# Patient Record
Sex: MALE | Race: WHITE | NOT HISPANIC OR LATINO | Employment: FULL TIME | URBAN - NONMETROPOLITAN AREA
[De-identification: names, ages, dates, MRNs, and addresses within clinical notes are randomized per-mention and may not be internally consistent; named-entity substitution may affect disease eponyms.]

---

## 2017-02-06 ENCOUNTER — OFFICE VISIT (OUTPATIENT)
Dept: FAMILY MEDICINE CLINIC | Facility: CLINIC | Age: 52
End: 2017-02-06

## 2017-02-06 VITALS
SYSTOLIC BLOOD PRESSURE: 136 MMHG | HEART RATE: 88 BPM | TEMPERATURE: 97.9 F | BODY MASS INDEX: 31.5 KG/M2 | DIASTOLIC BLOOD PRESSURE: 84 MMHG | RESPIRATION RATE: 16 BRPM | WEIGHT: 220 LBS | HEIGHT: 70 IN | OXYGEN SATURATION: 97 %

## 2017-02-06 DIAGNOSIS — R53.83 FATIGUE, UNSPECIFIED TYPE: ICD-10-CM

## 2017-02-06 DIAGNOSIS — E66.9 OBESITY, UNSPECIFIED OBESITY SEVERITY, UNSPECIFIED OBESITY TYPE: ICD-10-CM

## 2017-02-06 DIAGNOSIS — M77.11 LATERAL EPICONDYLITIS OF RIGHT ELBOW: Primary | ICD-10-CM

## 2017-02-06 PROCEDURE — 99214 OFFICE O/P EST MOD 30 MIN: CPT | Performed by: FAMILY MEDICINE

## 2017-02-06 RX ORDER — NABUMETONE 750 MG/1
750 TABLET, FILM COATED ORAL 2 TIMES DAILY
Qty: 60 TABLET | Refills: 2 | Status: SHIPPED | OUTPATIENT
Start: 2017-02-06 | End: 2017-03-24

## 2017-02-07 ENCOUNTER — LAB (OUTPATIENT)
Dept: LAB | Facility: OTHER | Age: 52
End: 2017-02-07

## 2017-02-07 DIAGNOSIS — R53.83 FATIGUE, UNSPECIFIED TYPE: ICD-10-CM

## 2017-02-07 DIAGNOSIS — R73.9 HYPERGLYCEMIA: Primary | ICD-10-CM

## 2017-02-07 DIAGNOSIS — E66.9 OBESITY, UNSPECIFIED OBESITY SEVERITY, UNSPECIFIED OBESITY TYPE: ICD-10-CM

## 2017-02-07 LAB
ALBUMIN SERPL-MCNC: 4.6 G/DL (ref 3.2–5.5)
ALBUMIN/GLOB SERPL: 1.4 G/DL (ref 1–3)
ALP SERPL-CCNC: 53 U/L (ref 15–121)
ALT SERPL W P-5'-P-CCNC: 27 U/L (ref 10–60)
ANION GAP SERPL CALCULATED.3IONS-SCNC: 9 MMOL/L (ref 5–15)
AST SERPL-CCNC: 24 U/L (ref 10–60)
BASOPHILS # BLD AUTO: 0.02 10*3/MM3 (ref 0–0.2)
BASOPHILS NFR BLD AUTO: 0.4 % (ref 0–2)
BILIRUB SERPL-MCNC: 1.2 MG/DL (ref 0.2–1)
BUN BLD-MCNC: 14 MG/DL (ref 8–25)
BUN/CREAT SERPL: 14 (ref 7–25)
CALCIUM SPEC-SCNC: 9.3 MG/DL (ref 8.4–10.8)
CHLORIDE SERPL-SCNC: 107 MMOL/L (ref 100–112)
CHOLEST SERPL-MCNC: 221 MG/DL (ref 150–200)
CO2 SERPL-SCNC: 25 MMOL/L (ref 20–32)
CREAT BLD-MCNC: 1 MG/DL (ref 0.4–1.3)
DEPRECATED RDW RBC AUTO: 43.9 FL (ref 35.1–43.9)
EOSINOPHIL # BLD AUTO: 0.25 10*3/MM3 (ref 0–0.7)
EOSINOPHIL NFR BLD AUTO: 4.4 % (ref 0–7)
ERYTHROCYTE [DISTWIDTH] IN BLOOD BY AUTOMATED COUNT: 14.3 % (ref 11.5–14.5)
GFR SERPL CREATININE-BSD FRML MDRD: 78 ML/MIN/1.73 (ref 56–130)
GLOBULIN UR ELPH-MCNC: 3.2 GM/DL (ref 2.5–4.6)
GLUCOSE BLD-MCNC: 108 MG/DL (ref 70–100)
HCT VFR BLD AUTO: 43 % (ref 39–49)
HDLC SERPL-MCNC: 51 MG/DL (ref 35–100)
HGB BLD-MCNC: 14.2 G/DL (ref 13.7–17.3)
LDLC SERPL CALC-MCNC: 141 MG/DL
LDLC/HDLC SERPL: 2.76 {RATIO}
LYMPHOCYTES # BLD AUTO: 1.86 10*3/MM3 (ref 0.6–4.2)
LYMPHOCYTES NFR BLD AUTO: 32.6 % (ref 10–50)
MCH RBC QN AUTO: 28.3 PG (ref 26.5–34)
MCHC RBC AUTO-ENTMCNC: 33 G/DL (ref 31.5–36.3)
MCV RBC AUTO: 85.8 FL (ref 80–98)
MONOCYTES # BLD AUTO: 0.77 10*3/MM3 (ref 0–0.9)
MONOCYTES NFR BLD AUTO: 13.5 % (ref 0–12)
NEUTROPHILS # BLD AUTO: 2.81 10*3/MM3 (ref 2–8.6)
NEUTROPHILS NFR BLD AUTO: 49.1 % (ref 37–80)
PLATELET # BLD AUTO: 240 10*3/MM3 (ref 150–450)
PMV BLD AUTO: 9.1 FL (ref 8–12)
POTASSIUM BLD-SCNC: 4.4 MMOL/L (ref 3.4–5.4)
PROT SERPL-MCNC: 7.8 G/DL (ref 6.7–8.2)
RBC # BLD AUTO: 5.01 10*6/MM3 (ref 4.37–5.74)
SODIUM BLD-SCNC: 141 MMOL/L (ref 134–146)
TRIGL SERPL-MCNC: 145 MG/DL (ref 35–160)
VLDLC SERPL-MCNC: 29 MG/DL
WBC NRBC COR # BLD: 5.71 10*3/MM3 (ref 3.2–9.8)

## 2017-02-07 PROCEDURE — 83036 HEMOGLOBIN GLYCOSYLATED A1C: CPT | Performed by: FAMILY MEDICINE

## 2017-02-07 PROCEDURE — 85025 COMPLETE CBC W/AUTO DIFF WBC: CPT | Performed by: FAMILY MEDICINE

## 2017-02-07 PROCEDURE — G0103 PSA SCREENING: HCPCS | Performed by: FAMILY MEDICINE

## 2017-02-07 PROCEDURE — 36415 COLL VENOUS BLD VENIPUNCTURE: CPT | Performed by: FAMILY MEDICINE

## 2017-02-07 PROCEDURE — 80061 LIPID PANEL: CPT | Performed by: FAMILY MEDICINE

## 2017-02-07 PROCEDURE — 84443 ASSAY THYROID STIM HORMONE: CPT | Performed by: FAMILY MEDICINE

## 2017-02-07 PROCEDURE — 80053 COMPREHEN METABOLIC PANEL: CPT | Performed by: FAMILY MEDICINE

## 2017-02-08 LAB
HBA1C MFR BLD: 5.78 % (ref 4–5.6)
PSA SERPL-MCNC: 0.37 NG/ML (ref 0–4)
TSH SERPL DL<=0.05 MIU/L-ACNC: 3.08 MIU/ML (ref 0.46–4.68)

## 2017-03-01 ENCOUNTER — OFFICE VISIT (OUTPATIENT)
Dept: FAMILY MEDICINE CLINIC | Facility: CLINIC | Age: 52
End: 2017-03-01

## 2017-03-01 VITALS
OXYGEN SATURATION: 97 % | TEMPERATURE: 97.7 F | DIASTOLIC BLOOD PRESSURE: 98 MMHG | HEART RATE: 76 BPM | WEIGHT: 215 LBS | BODY MASS INDEX: 30.78 KG/M2 | SYSTOLIC BLOOD PRESSURE: 164 MMHG | HEIGHT: 70 IN | RESPIRATION RATE: 16 BRPM

## 2017-03-01 DIAGNOSIS — E78.5 HYPERLIPIDEMIA, UNSPECIFIED HYPERLIPIDEMIA TYPE: Primary | ICD-10-CM

## 2017-03-01 DIAGNOSIS — R73.9 HYPERGLYCEMIA: ICD-10-CM

## 2017-03-01 DIAGNOSIS — Z12.12 SCREENING FOR COLORECTAL CANCER: ICD-10-CM

## 2017-03-01 DIAGNOSIS — Z12.11 SCREENING FOR COLORECTAL CANCER: ICD-10-CM

## 2017-03-01 DIAGNOSIS — IMO0001 ELEVATED BLOOD PRESSURE: ICD-10-CM

## 2017-03-01 PROCEDURE — 99214 OFFICE O/P EST MOD 30 MIN: CPT | Performed by: FAMILY MEDICINE

## 2017-03-01 NOTE — PROGRESS NOTES
Subjective   Zurdo Rothman is a 51 y.o. male who presents to the office for follow-up and review of labs.     History of Present Illness    patient with history of obesity ad fatigue is in today for  Re-eval and review of labs.  No new complaints.  The following portions of the patient's history were reviewed and updated as appropriate: allergies, current medications, past family history, past medical history, past social history, past surgical history and problem list.    Review of Systems   Constitutional: Positive for fatigue.   HENT: Negative.    Eyes: Negative.    Respiratory: Negative.    Cardiovascular: Negative.    Gastrointestinal: Negative.    Endocrine: Negative.    Genitourinary: Negative.    Musculoskeletal: Positive for arthralgias. Negative for joint swelling.   Skin: Negative.    Allergic/Immunologic: Negative.    Neurological: Positive for weakness. Negative for numbness.   Hematological: Negative.    Psychiatric/Behavioral: Negative.    All other systems reviewed and are negative.      Objective   Physical Exam   Constitutional: He is oriented to person, place, and time. He appears well-developed and well-nourished.   HENT:   Head: Normocephalic and atraumatic.   Right Ear: External ear normal.   Left Ear: External ear normal.   Nose: Nose normal.   Mouth/Throat: Oropharynx is clear and moist.   Eyes: Conjunctivae and EOM are normal. Pupils are equal, round, and reactive to light.   Neck: Normal range of motion. Neck supple.   Cardiovascular: Normal rate, regular rhythm, normal heart sounds and intact distal pulses.  Exam reveals no gallop and no friction rub.    No murmur heard.  Pulmonary/Chest: Effort normal and breath sounds normal. He has no wheezes. He has no rales.   Abdominal: Soft. Bowel sounds are normal. He exhibits no mass. There is no tenderness. There is no rebound and no guarding.   Central obesity   Musculoskeletal: Normal range of motion.   Neurological: He is alert and  oriented to person, place, and time. He has normal reflexes. No cranial nerve deficit. He exhibits normal muscle tone.   Skin: Skin is warm and dry. No rash noted.   Psychiatric: He has a normal mood and affect. His behavior is normal. Judgment and thought content normal.   Nursing note and vitals reviewed.      Assessment/Plan   Zurdo was seen today for annual exam.    Diagnoses and all orders for this visit:    Hyperlipidemia, unspecified hyperlipidemia type  -     Comprehensive Metabolic Panel; Future  -     LDL Cholesterol, Direct; Future    Hyperglycemia  -     Hemoglobin A1c; Future    Elevated blood pressure    Screening for colorectal cancer  -     Ambulatory Referral to General Surgery      Repeat blood pressure was 154/78. Dietary and lifestyle modifications encouraged.  Weight loss encouraged.     Labs are reviewed with patient.    Orders Only on 02/07/2017   Component Date Value Ref Range Status   • Hemoglobin A1C 02/07/2017 5.78* 4 - 5.6 % Final   Lab on 02/07/2017   Component Date Value Ref Range Status   • Glucose 02/07/2017 108* 70 - 100 mg/dL Final   • BUN 02/07/2017 14  8 - 25 mg/dL Final   • Creatinine 02/07/2017 1.00  0.40 - 1.30 mg/dL Final   • Sodium 02/07/2017 141  134 - 146 mmol/L Final   • Potassium 02/07/2017 4.4  3.4 - 5.4 mmol/L Final   • Chloride 02/07/2017 107  100 - 112 mmol/L Final   • CO2 02/07/2017 25.0  20.0 - 32.0 mmol/L Final   • Calcium 02/07/2017 9.3  8.4 - 10.8 mg/dL Final   • Total Protein 02/07/2017 7.8  6.7 - 8.2 g/dL Final   • Albumin 02/07/2017 4.60  3.20 - 5.50 g/dL Final   • ALT (SGPT) 02/07/2017 27  10 - 60 U/L Final   • AST (SGOT) 02/07/2017 24  10 - 60 U/L Final   • Alkaline Phosphatase 02/07/2017 53  15 - 121 U/L Final   • Total Bilirubin 02/07/2017 1.2* 0.2 - 1.0 mg/dL Final   • eGFR Non African Amer 02/07/2017 78  56 - 130 mL/min/1.73 Final   • Globulin 02/07/2017 3.2  2.5 - 4.6 gm/dL Final   • A/G Ratio 02/07/2017 1.4  1.0 - 3.0 g/dL Final   • BUN/Creatinine  Ratio 02/07/2017 14.0  7.0 - 25.0 Final   • Anion Gap 02/07/2017 9.0  5.0 - 15.0 mmol/L Final   • Total Cholesterol 02/07/2017 221* 150 - 200 mg/dL Final   • Triglycerides 02/07/2017 145  35 - 160 mg/dL Final   • HDL Cholesterol 02/07/2017 51  35 - 100 mg/dL Final   • LDL Cholesterol  02/07/2017 141  mg/dL Final   • VLDL Cholesterol 02/07/2017 29  mg/dL Final   • LDL/HDL Ratio 02/07/2017 2.76   Final   • TSH 02/07/2017 3.080  0.460 - 4.680 mIU/mL Final   • PSA 02/07/2017 0.368  0.000 - 4.000 ng/mL Final   • WBC 02/07/2017 5.71  3.20 - 9.80 10*3/mm3 Final   • RBC 02/07/2017 5.01  4.37 - 5.74 10*6/mm3 Final   • Hemoglobin 02/07/2017 14.2  13.7 - 17.3 g/dL Final   • Hematocrit 02/07/2017 43.0  39.0 - 49.0 % Final   • MCV 02/07/2017 85.8  80.0 - 98.0 fL Final   • MCH 02/07/2017 28.3  26.5 - 34.0 pg Final   • MCHC 02/07/2017 33.0  31.5 - 36.3 g/dL Final   • RDW 02/07/2017 14.3  11.5 - 14.5 % Final   • RDW-SD 02/07/2017 43.9  35.1 - 43.9 fl Final   • MPV 02/07/2017 9.1  8.0 - 12.0 fL Final   • Platelets 02/07/2017 240  150 - 450 10*3/mm3 Final   • Neutrophil % 02/07/2017 49.1  37.0 - 80.0 % Final   • Lymphocyte % 02/07/2017 32.6  10.0 - 50.0 % Final   • Monocyte % 02/07/2017 13.5* 0.0 - 12.0 % Final   • Eosinophil % 02/07/2017 4.4  0.0 - 7.0 % Final   • Basophil % 02/07/2017 0.4  0.0 - 2.0 % Final   • Neutrophils, Absolute 02/07/2017 2.81  2.00 - 8.60 10*3/mm3 Final   • Lymphocytes, Absolute 02/07/2017 1.86  0.60 - 4.20 10*3/mm3 Final   • Monocytes, Absolute 02/07/2017 0.77  0.00 - 0.90 10*3/mm3 Final   • Eosinophils, Absolute 02/07/2017 0.25  0.00 - 0.70 10*3/mm3 Final   • Basophils, Absolute 02/07/2017 0.02  0.00 - 0.20 10*3/mm3 Final   Abstract on 02/03/2017   Component Date Value Ref Range Status   •  Mammogram 06/27/2013 Complete.   Ordering provider - Laron Santos   Final   ]

## 2017-03-07 DIAGNOSIS — Z12.11 SCREEN FOR COLON CANCER: Primary | ICD-10-CM

## 2017-03-07 RX ORDER — DEXTROSE AND SODIUM CHLORIDE 5; .45 G/100ML; G/100ML
100 INJECTION, SOLUTION INTRAVENOUS CONTINUOUS
Status: CANCELLED | OUTPATIENT
Start: 2017-03-29

## 2017-03-29 ENCOUNTER — ANESTHESIA EVENT (OUTPATIENT)
Dept: GASTROENTEROLOGY | Facility: HOSPITAL | Age: 52
End: 2017-03-29

## 2017-03-29 ENCOUNTER — ANESTHESIA (OUTPATIENT)
Dept: GASTROENTEROLOGY | Facility: HOSPITAL | Age: 52
End: 2017-03-29

## 2017-03-29 ENCOUNTER — HOSPITAL ENCOUNTER (OUTPATIENT)
Facility: HOSPITAL | Age: 52
Setting detail: HOSPITAL OUTPATIENT SURGERY
Discharge: HOME | End: 2017-03-29
Attending: SURGERY | Admitting: SURGERY

## 2017-03-29 VITALS
HEART RATE: 68 BPM | SYSTOLIC BLOOD PRESSURE: 115 MMHG | TEMPERATURE: 98.2 F | BODY MASS INDEX: 30.46 KG/M2 | OXYGEN SATURATION: 94 % | WEIGHT: 212.74 LBS | RESPIRATION RATE: 20 BRPM | DIASTOLIC BLOOD PRESSURE: 71 MMHG | HEIGHT: 70 IN

## 2017-03-29 DIAGNOSIS — Z12.11 SCREEN FOR COLON CANCER: ICD-10-CM

## 2017-03-29 PROCEDURE — 45378 DIAGNOSTIC COLONOSCOPY: CPT | Performed by: SURGERY

## 2017-03-29 PROCEDURE — 25010000002 PROPOFOL 10 MG/ML EMULSION: Performed by: NURSE ANESTHETIST, CERTIFIED REGISTERED

## 2017-03-29 PROCEDURE — 25010000002 MIDAZOLAM PER 1 MG: Performed by: NURSE ANESTHETIST, CERTIFIED REGISTERED

## 2017-03-29 PROCEDURE — 25010000002 FENTANYL CITRATE (PF) 100 MCG/2ML SOLUTION: Performed by: NURSE ANESTHETIST, CERTIFIED REGISTERED

## 2017-03-29 RX ORDER — PROMETHAZINE HYDROCHLORIDE 25 MG/ML
12.5 INJECTION, SOLUTION INTRAMUSCULAR; INTRAVENOUS ONCE AS NEEDED
Status: DISCONTINUED | OUTPATIENT
Start: 2017-03-29 | End: 2017-03-29 | Stop reason: HOSPADM

## 2017-03-29 RX ORDER — ONDANSETRON 2 MG/ML
4 INJECTION INTRAMUSCULAR; INTRAVENOUS ONCE AS NEEDED
Status: DISCONTINUED | OUTPATIENT
Start: 2017-03-29 | End: 2017-03-29 | Stop reason: HOSPADM

## 2017-03-29 RX ORDER — PROMETHAZINE HYDROCHLORIDE 25 MG/1
25 TABLET ORAL ONCE AS NEEDED
Status: DISCONTINUED | OUTPATIENT
Start: 2017-03-29 | End: 2017-03-29 | Stop reason: HOSPADM

## 2017-03-29 RX ORDER — MIDAZOLAM HYDROCHLORIDE 1 MG/ML
INJECTION INTRAMUSCULAR; INTRAVENOUS AS NEEDED
Status: DISCONTINUED | OUTPATIENT
Start: 2017-03-29 | End: 2017-03-29 | Stop reason: SURG

## 2017-03-29 RX ORDER — PROPOFOL 10 MG/ML
VIAL (ML) INTRAVENOUS AS NEEDED
Status: DISCONTINUED | OUTPATIENT
Start: 2017-03-29 | End: 2017-03-29 | Stop reason: SURG

## 2017-03-29 RX ORDER — FENTANYL CITRATE 50 UG/ML
INJECTION, SOLUTION INTRAMUSCULAR; INTRAVENOUS AS NEEDED
Status: DISCONTINUED | OUTPATIENT
Start: 2017-03-29 | End: 2017-03-29 | Stop reason: SURG

## 2017-03-29 RX ORDER — DEXTROSE AND SODIUM CHLORIDE 5; .45 G/100ML; G/100ML
100 INJECTION, SOLUTION INTRAVENOUS CONTINUOUS
Status: DISCONTINUED | OUTPATIENT
Start: 2017-03-29 | End: 2017-03-29 | Stop reason: HOSPADM

## 2017-03-29 RX ORDER — PROMETHAZINE HYDROCHLORIDE 25 MG/1
25 SUPPOSITORY RECTAL ONCE AS NEEDED
Status: DISCONTINUED | OUTPATIENT
Start: 2017-03-29 | End: 2017-03-29 | Stop reason: HOSPADM

## 2017-03-29 RX ORDER — DEXAMETHASONE SODIUM PHOSPHATE 4 MG/ML
8 INJECTION, SOLUTION INTRA-ARTICULAR; INTRALESIONAL; INTRAMUSCULAR; INTRAVENOUS; SOFT TISSUE ONCE AS NEEDED
Status: DISCONTINUED | OUTPATIENT
Start: 2017-03-29 | End: 2017-03-29 | Stop reason: HOSPADM

## 2017-03-29 RX ADMIN — FENTANYL CITRATE 100 MCG: 50 INJECTION, SOLUTION INTRAMUSCULAR; INTRAVENOUS at 11:31

## 2017-03-29 RX ADMIN — DEXTROSE AND SODIUM CHLORIDE 100 ML/HR: 5; 450 INJECTION, SOLUTION INTRAVENOUS at 11:03

## 2017-03-29 RX ADMIN — MIDAZOLAM 2 MG: 1 INJECTION INTRAMUSCULAR; INTRAVENOUS at 11:31

## 2017-03-29 RX ADMIN — PROPOFOL 50 MG: 10 INJECTION, EMULSION INTRAVENOUS at 11:31

## 2017-03-29 NOTE — PLAN OF CARE
Problem: Patient Care Overview (Adult)  Goal: Plan of Care Review  Outcome: Ongoing (interventions implemented as appropriate)    03/29/17 1142   Coping/Psychosocial Response Interventions   Plan Of Care Reviewed With patient   Patient Care Overview   Progress no change   Outcome Evaluation   Outcome Summary/Follow up Plan marie well

## 2017-03-29 NOTE — ANESTHESIA PREPROCEDURE EVALUATION
Anesthesia Evaluation     NPO Status: > 6 hours   Airway   Mallampati: II  no difficulty expected  Dental - normal exam     Pulmonary - negative pulmonary ROS and normal exam   Cardiovascular - negative cardio ROS and normal exam        Neuro/Psych- negative ROS  GI/Hepatic/Renal/Endo    (+) obesity,      Musculoskeletal (-) negative ROS    Abdominal    Substance History      OB/GYN negative ob/gyn ROS         Other                                    Anesthesia Plan    ASA 2     MAC     Anesthetic plan and risks discussed with patient.

## 2017-03-29 NOTE — PLAN OF CARE
Problem: GI Endoscopy (Adult)  Goal: Signs and Symptoms of Listed Potential Problems Will be Absent or Manageable (GI Endoscopy)    03/29/17 1141   GI Endoscopy   Problems Assessed (GI Endoscopy) all   Problems Present (GI Endoscopy) none

## 2017-03-29 NOTE — ANESTHESIA POSTPROCEDURE EVALUATION
Patient: Zurdo Rothman    Procedure Summary     Date Anesthesia Start Anesthesia Stop Room / Location    03/29/17 1131 1145 [facility] ENDOSCOPY 2 / [facility] ENDOSCOPY       Procedure Diagnosis Surgeon Provider    COLONOSCOPY (N/A ) Screen for colon cancer  (Screen for colon cancer [Z12.11]) MD Kelly Winkler CRNA          Anesthesia Type: MAC  Last vitals  /89 (03/29/17 1058)    Temp 97.4 °F (36.3 °C) (03/29/17 1058)    Pulse 65 (03/29/17 1058)   Resp 20 (03/29/17 1058)    SpO2 99 % (03/29/17 1058)      Post Anesthesia Care and Evaluation    Patient location during evaluation: bedside  Patient participation: complete - patient participated  Level of consciousness: awake and alert  Pain score: 0  Pain management: adequate  Airway patency: patent  Anesthetic complications: No anesthetic complications  PONV Status: none  Cardiovascular status: acceptable  Respiratory status: acceptable  Hydration status: acceptable

## 2017-03-29 NOTE — H&P
No chief complaint on file.     Referred by Dr DARCY Santos for screening  Zurdo Rothman is a 51 y.o. male referred today for evaluation for colonoscopy.  He notes no change in bowel habits, no blood in the stool.     Prior Colonoscopy:no  Prior Polyps:no  Family History of Colon Cancer:no  On anticoagulation:no    Past Surgical History:   Procedure Laterality Date   • HAND SURGERY Right     hand pinned back together    • INJECTION OF MEDICATION  11/14/2014    Depo Medrol (Methylprednisone) 80mg (3)  - ELEAZAR Santos     Past Medical History:   Diagnosis Date   • Acute sinusitis    • Atopic dermatitis    • Breast lump    • History of mammogram 06/27/2013    MAMMOGRAM UNILATERAL LT 90763 ([facility]) (1) - ELEAZAR Santos   • Influenza    • Obesity      Social History     Social History   • Marital status:      Spouse name: Sathya   • Number of children: 3   • Years of education: N/A     Occupational History   • Not on file.     Social History Main Topics   • Smoking status: Never Smoker   • Smokeless tobacco: Never Used   • Alcohol use Yes      Comment: occassional   • Drug use: No   • Sexual activity: Yes     Partners: Female     Other Topics Concern   • Not on file     Social History Narrative     Current Facility-Administered Medications   Medication Dose Route Frequency Provider Last Rate Last Dose   • dextrose 5 % and sodium chloride 0.45 % infusion  100 mL/hr Intravenous Continuous aDvid Monge  mL/hr at 03/29/17 1103 100 mL/hr at 03/29/17 1103       Review of Systems   Constitutional: Negative.    HENT: Negative for hearing loss, nosebleeds and trouble swallowing.    Respiratory: Negative for apnea, chest tightness and shortness of breath.    Cardiovascular: Negative for chest pain and palpitations.   Gastrointestinal: Negative for abdominal distention, abdominal pain, blood in stool, constipation, diarrhea, nausea and vomiting.   Genitourinary: Negative for difficulty urinating, dysuria, frequency and  urgency.   Musculoskeletal: Negative for back pain, joint swelling and neck pain.   Skin: Negative for rash.   Neurological: Negative for dizziness, seizures, weakness, light-headedness, numbness and headaches.   Hematological: Negative for adenopathy.   Psychiatric/Behavioral: Negative for agitation. The patient is not nervous/anxious.        Vitals:    03/29/17 1058   BP: 140/89   Pulse: 65   Resp: 20   Temp: 97.4 °F (36.3 °C)   SpO2: 99%       Physical Exam   Constitutional: He is oriented to person, place, and time. He appears well-developed and well-nourished. No distress.   HENT:   Head: Normocephalic and atraumatic.   Nose: Nose normal.   Eyes: Conjunctivae are normal.   Neck: Normal range of motion. No tracheal deviation present. No thyromegaly present.   Cardiovascular: Normal rate, regular rhythm and normal heart sounds.    No murmur heard.  Pulmonary/Chest: Effort normal and breath sounds normal. No respiratory distress. He has no wheezes. He has no rales. He exhibits no tenderness.   Abdominal: Soft. He exhibits no distension. There is no tenderness. There is no rebound and no guarding. No hernia.   Musculoskeletal: He exhibits no tenderness or deformity.   Neurological: He is alert and oriented to person, place, and time.   Skin: Skin is warm and dry. No rash noted.   Psychiatric: He has a normal mood and affect. His behavior is normal. Judgment and thought content normal.   Vitals reviewed.      Assessment     In need of screening colonoscopy.    Plan     Risks, benefits, rationale and prep for colonoscopy have been discussed with the patient.  The patient indicates understanding of these issues and agrees with the plan.

## 2018-01-11 ENCOUNTER — OFFICE VISIT (OUTPATIENT)
Dept: ORTHOPEDIC SURGERY | Facility: CLINIC | Age: 53
End: 2018-01-11

## 2018-01-11 VITALS — WEIGHT: 231.6 LBS | BODY MASS INDEX: 33.16 KG/M2 | HEIGHT: 70 IN

## 2018-01-11 DIAGNOSIS — S52.251A CLOSED DISPLACED COMMINUTED FRACTURE OF SHAFT OF RIGHT ULNA, INITIAL ENCOUNTER: Primary | ICD-10-CM

## 2018-01-11 DIAGNOSIS — M25.531 RIGHT WRIST PAIN: ICD-10-CM

## 2018-01-11 DIAGNOSIS — M79.641 RIGHT HAND PAIN: ICD-10-CM
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
## 2018-01-11 DIAGNOSIS — M79.601 RIGHT ARM PAIN: Primary | ICD-10-CM

## 2018-01-11 PROCEDURE — 99214 OFFICE O/P EST MOD 30 MIN: CPT | Performed by: NURSE PRACTITIONER

## 2018-01-11 PROCEDURE — 25530 CLTX ULNAR SHFT FX W/O MNPJ: CPT | Performed by: NURSE PRACTITIONER

## 2018-01-11 RX ORDER — HYDROCODONE BITARTRATE AND ACETAMINOPHEN 7.5; 325 MG/1; MG/1
1 TABLET ORAL EVERY 6 HOURS PRN
Qty: 40 TABLET | Refills: 0 | Status: SHIPPED | OUTPATIENT
Start: 2018-01-11 | End: 2018-01-21

## 2018-01-11 NOTE — PROGRESS NOTES
Zurdo Rothman is a 52 y.o. male   Primary provider:  Laron Santos MD       Chief Complaint   Patient presents with   • Right Wrist - Establish Care     Xray today at [place]       HISTORY OF PRESENT ILLNESS:    HPI Comments: Patient complains of right wrist/forearm pain after being kicked by a horse last night.  Patient had x-rays today per his primary care physician which showed an ulnar fracture.  Patient was referred to orthopedics.  Pain is described as moderate in severity.  Pain is described as aching in nature with associated bruising, swelling and popping sensations.  Pain is worse with any movement of his right wrist and palpation.  He has tried nothing for the pain thus far.     Wrist Injury    Incident onset: 1/10/2018. The incident occurred at home. Injury mechanism: was kicked by a horse. The pain is present in the right wrist and right forearm. The quality of the pain is described as aching. The pain is at a severity of 6/10. The pain is moderate. The pain has been constant since the incident. The symptoms are aggravated by movement and palpation. He has tried nothing for the symptoms.        CONCURRENT MEDICAL HISTORY:    Past Medical History:   Diagnosis Date   • Acute sinusitis    • Atopic dermatitis    • Breast lump    • History of mammogram 06/27/2013    MAMMOGRAM UNILATERAL LT [address] ([facility]) (1) - ELEAZAR Santos   • Influenza    • Obesity        No Known Allergies      Current Outpatient Prescriptions:   •  HYDROcodone-acetaminophen (NORCO) 7.5-325 MG per tablet, Take 1 tablet by mouth Every 6 (Six) Hours As Needed for Moderate Pain  for up to 10 days., Disp: 40 tablet, Rfl: 0    Past Surgical History:   Procedure Laterality Date   • COLONOSCOPY N/A 3/29/2017    Procedure: COLONOSCOPY;  Surgeon: David Monge MD;  Location: [facility] ENDOSCOPY;  Service:    • HAND SURGERY Right     hand pinned back together    • INJECTION OF MEDICATION  11/14/2014    Depo Medrol (Methylprednisone) 80mg  "(3)  - ELEAZAR Santos       Family History   Problem Relation Age of Onset   • Cancer Mother    • Heart disease Father         Social History     Social History   • Marital status:      Spouse name: Sathya   • Number of children: 3   • Years of education: N/A     Occupational History   • Not on file.     Social History Main Topics   • Smoking status: Never Smoker   • Smokeless tobacco: Never Used   • Alcohol use Yes      Comment: occassional   • Drug use: No   • Sexual activity: Yes     Partners: Female     Other Topics Concern   • Not on file     Social History Narrative        Review of Systems   Musculoskeletal: Positive for joint swelling.        Right wrist pain. Right forearm pain.    All other systems reviewed and are negative.      PHYSICAL EXAMINATION:       Ht 177.8 cm (70\")  Wt 105 kg (231 lb 9.6 oz)  BMI 33.23 kg/m2    Physical Exam   Constitutional: He is oriented to person, place, and time. Vital signs are normal. He appears well-developed and well-nourished.   HENT:   Head: Normocephalic.   Pulmonary/Chest: Effort normal. No respiratory distress.   Abdominal: Soft. He exhibits no distension.   Neurological: He is alert and oriented to person, place, and time. GCS eye subscore is 4. GCS verbal subscore is 5. GCS motor subscore is 6.   Skin: Skin is warm, dry and intact.   Psychiatric: He has a normal mood and affect. His speech is normal and behavior is normal. Judgment and thought content normal. Cognition and memory are normal.   Vitals reviewed.      GAIT:     [x]  Normal  []  Antalgic    Assistive device: [x]  None  []  Walker     []  Crutches  []  Cane     []  Wheelchair  []  Stretcher    Right Hand Exam     Tenderness   Right hand tenderness location: diffuse.    Other   Erythema: absent  Sensation: normal  Pulse: present    Comments:  Moderate swelling present to wrist and forearm.  No obvious deformity noted.  Skin is intact. Range of motion and strength assessments deferred due to acute " ulnar shaft fracture.  Distal pulses strong.  Capillary refill is less than 3 seconds.      Left Hand Exam   Left hand exam is normal.            Xr Forearm 2 View Right    Result Date: 1/11/2018  Narrative: EXAM DESCRIPTION: RIGHT WRIST THREE VIEWS CLINICAL HISTORY: Status post kicked by horse with pain. COMPARISON: None FINDINGS:  PA, oblique, and lateral projections of the right wrist are obtained. There is short oblique oriented comminuted fracture with displacement involving the distal diaphysis of the ulna. The displacement is seen in a lateral and volar direction by approximately 4-5 mm. No additional fracture or dislocation identified. The mineralization is appropriate. The radiocarpal alignment is normal and the carpal arcs are maintained.     Impression: Comminuted short oblique oriented fracture with displacement involving the distal diaphysis of the ulna. EXAM DESCRIPTION: RIGHT FOREARM TWO VIEWS CLINICAL HISTORY: Status post kicked by horse with pain. COMPARISON: Right wrist same date. FINDINGS:  Frontal and lateral projections of the right forearm redemonstrates the comminuted displaced short oblique fracture involving the distal diaphysis of the ulna. Otherwise, alignment and mineralization are normal with no additional fracture or dislocation identified. The elbow joint is intact. No effusion. There is a tiny olecranon spur likely associated with the triceps insertion. IMPRESSION: Comminuted oblique oriented fracture with displacement involving the distal diaphysis of the ulna. Comment: Please note that multiple exams (two) are included in this report. Electronically signed by:  Pascual Cooper MD  1/11/2018 9:31 AM CST Workstation: [id number]    Xr Wrist 3+ View Right    Result Date: 1/11/2018  Narrative: EXAM DESCRIPTION: RIGHT WRIST THREE VIEWS CLINICAL HISTORY: Status post kicked by horse with pain. COMPARISON: None FINDINGS:  PA, oblique, and lateral projections of the right wrist are obtained.  There is short oblique oriented comminuted fracture with displacement involving the distal diaphysis of the ulna. The displacement is seen in a lateral and volar direction by approximately 4-5 mm. No additional fracture or dislocation identified. The mineralization is appropriate. The radiocarpal alignment is normal and the carpal arcs are maintained.     Impression: Comminuted short oblique oriented fracture with displacement involving the distal diaphysis of the ulna. EXAM DESCRIPTION: RIGHT FOREARM TWO VIEWS CLINICAL HISTORY: Status post kicked by horse with pain. COMPARISON: Right wrist same date. FINDINGS:  Frontal and lateral projections of the right forearm redemonstrates the comminuted displaced short oblique fracture involving the distal diaphysis of the ulna. Otherwise, alignment and mineralization are normal with no additional fracture or dislocation identified. The elbow joint is intact. No effusion. There is a tiny olecranon spur likely associated with the triceps insertion. IMPRESSION: Comminuted oblique oriented fracture with displacement involving the distal diaphysis of the ulna. Comment: Please note that multiple exams (two) are included in this report. Electronically signed by:  Pascual Cooper MD  1/11/2018 9:31 AM CST Workstation: [id number]    Xr Hand 3+ View Right    Result Date: 1/11/2018  Narrative: Procedure: XR HAND 3 OR MORE VIEWS. History: pain, M79.641 Pain in right hand. Comparison: None Findings: Three views of the right hand were obtained. Comminuted, oblique fracture of the distal ulnar diaphysis. No other fractures are seen.     Impression: Impression: Comminuted, oblique fracture of the distal ulnar diaphysis. Electronically signed by:  Laron Hernandez MD  1/11/2018 9:24 AM CST Workstation: [id number]    ASSESSMENT:    Diagnoses and all orders for this visit:    Closed displaced comminuted fracture of shaft of right ulna, initial encounter    Struck by horse, initial encounter    Other  orders  -     HYDROcodone-acetaminophen (NORCO) 7.5-325 MG per tablet; Take 1 tablet by mouth Every 6 (Six) Hours As Needed for Moderate Pain  for up to 10 days.      PLAN    X-rays of right wrist, right hand and right forearm view today.  Discussed case with Dr. Fernandes who has also reviewed x-rays in consultation.  Recommend short arm Exosplint today for immobilization.  Recommend elevation of the right wrist above the heart frequently to minimize swelling/pain.  Recommend ice therapy intermittently 3-4 times daily for 20 minutes at a time.  Recommend Norco as needed for pain.  Recommend Ibuprofen in conjunction with Norco for pain as well.  Follow-up with Dr. Fernandes on Monday to discuss continued conservative treatment of ulnar fracture versus surgical treatment.    Return Monday with Dr. Fernandes.      This document has been electronically signed by BROOKLYN Spangler on January 11, 2018 2:35 PM      BROOKLYN Spangler

## 2018-01-15 ENCOUNTER — OFFICE VISIT (OUTPATIENT)
Dept: ORTHOPEDIC SURGERY | Facility: CLINIC | Age: 53
End: 2018-01-15

## 2018-01-15 VITALS — WEIGHT: 231 LBS | BODY MASS INDEX: 33.07 KG/M2 | HEIGHT: 70 IN
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
## 2018-01-15 DIAGNOSIS — S52.251D CLOSED DISPLACED COMMINUTED FRACTURE OF SHAFT OF RIGHT ULNA WITH ROUTINE HEALING, SUBSEQUENT ENCOUNTER: Primary | ICD-10-CM

## 2018-01-15 PROCEDURE — 99024 POSTOP FOLLOW-UP VISIT: CPT | Performed by: ORTHOPAEDIC SURGERY

## 2018-01-15 NOTE — PROGRESS NOTES
Zurdo Rothman is a 52 y.o. male returns for     Chief Complaint   Patient presents with   • Right Wrist - Follow-up     Xray 1/11/18       HISTORY OF PRESENT ILLNESS:The patient is here for evaluation as to need for surgery patient is minimally displaced shortened fractures direct blow injury to his right ulna about 1 inch distal to the radial carpal joint area.  Patient was treated by Akila placed in a splint and is doing much better than he was initial visit at length the risk and benefits of surgical versus nonsurgical treatment plan at this point in time since satisfactory alignment to wear the splint although it can be removed for bathing and then recheck him back in a month re-x-rayed of his major deformity within surgery if not then now this to heal even though the be some shortening offset and certainly not going to be a functional problem that he get its precertified for surgery but in case it slips then we will anticipate re-positioning and further and surgery right conservative nonoperative course first patient be returned in 2 weeks and re-x-ray at that time       CONCURRENT MEDICAL HISTORY:    Past Medical History:   Diagnosis Date   • Acute sinusitis    • Atopic dermatitis    • Breast lump    • History of mammogram 06/27/2013    MAMMOGRAM UNILATERAL LT 96236 ([facility]) (1) - ELEAZAR Santos   • Influenza    • Obesity        No Known Allergies      Current Outpatient Prescriptions:   •  HYDROcodone-acetaminophen (NORCO) 7.5-325 MG per tablet, Take 1 tablet by mouth Every 6 (Six) Hours As Needed for Moderate Pain  for up to 10 days., Disp: 40 tablet, Rfl: 0    Past Surgical History:   Procedure Laterality Date   • COLONOSCOPY N/A 3/29/2017    Procedure: COLONOSCOPY;  Surgeon: David Monge MD;  Location: [facility] ENDOSCOPY;  Service:    • HAND SURGERY Right     hand pinned back together    • INJECTION OF MEDICATION  11/14/2014    Depo Medrol (Methylprednisone) 80mg (3)  - ELEAZAR Santos       ROS  No fevers  "or chills.  No chest pain or shortness of air.  No GI or  disturbances.    PHYSICAL EXAMINATION:       Ht 177.8 cm (70\")  Wt 105 kg (231 lb)  BMI 33.15 kg/m2    Physical Exam    GAIT:     []  Normal  []  Antalgic    Assistive device: []  None  []  Walker     []  Crutches  []  Cane     []  Wheelchair  []  Stretcher    Ortho Exam      Xr Forearm 2 View Right    Result Date: 1/11/2018  Narrative: EXAM DESCRIPTION: RIGHT WRIST THREE VIEWS CLINICAL HISTORY: Status post kicked by horse with pain. COMPARISON: None FINDINGS:  PA, oblique, and lateral projections of the right wrist are obtained. There is short oblique oriented comminuted fracture with displacement involving the distal diaphysis of the ulna. The displacement is seen in a lateral and volar direction by approximately 4-5 mm. No additional fracture or dislocation identified. The mineralization is appropriate. The radiocarpal alignment is normal and the carpal arcs are maintained.     Impression: Comminuted short oblique oriented fracture with displacement involving the distal diaphysis of the ulna. EXAM DESCRIPTION: RIGHT FOREARM TWO VIEWS CLINICAL HISTORY: Status post kicked by horse with pain. COMPARISON: Right wrist same date. FINDINGS:  Frontal and lateral projections of the right forearm redemonstrates the comminuted displaced short oblique fracture involving the distal diaphysis of the ulna. Otherwise, alignment and mineralization are normal with no additional fracture or dislocation identified. The elbow joint is intact. No effusion. There is a tiny olecranon spur likely associated with the triceps insertion. IMPRESSION: Comminuted oblique oriented fracture with displacement involving the distal diaphysis of the ulna. Comment: Please note that multiple exams (two) are included in this report. Electronically signed by:  Pascual Cooper MD  1/11/2018 9:31 AM CST Workstation: [id number]    Xr Wrist 3+ View Right    Result Date: 1/11/2018  Narrative: EXAM " DESCRIPTION: RIGHT WRIST THREE VIEWS CLINICAL HISTORY: Status post kicked by horse with pain. COMPARISON: None FINDINGS:  PA, oblique, and lateral projections of the right wrist are obtained. There is short oblique oriented comminuted fracture with displacement involving the distal diaphysis of the ulna. The displacement is seen in a lateral and volar direction by approximately 4-5 mm. No additional fracture or dislocation identified. The mineralization is appropriate. The radiocarpal alignment is normal and the carpal arcs are maintained.     Impression: Comminuted short oblique oriented fracture with displacement involving the distal diaphysis of the ulna. EXAM DESCRIPTION: RIGHT FOREARM TWO VIEWS CLINICAL HISTORY: Status post kicked by horse with pain. COMPARISON: Right wrist same date. FINDINGS:  Frontal and lateral projections of the right forearm redemonstrates the comminuted displaced short oblique fracture involving the distal diaphysis of the ulna. Otherwise, alignment and mineralization are normal with no additional fracture or dislocation identified. The elbow joint is intact. No effusion. There is a tiny olecranon spur likely associated with the triceps insertion. IMPRESSION: Comminuted oblique oriented fracture with displacement involving the distal diaphysis of the ulna. Comment: Please note that multiple exams (two) are included in this report. Electronically signed by:  Pascual Cooper MD  1/11/2018 9:31 AM CST Workstation: [id number]    Xr Hand 3+ View Right    Result Date: 1/11/2018  Narrative: Procedure: XR HAND 3 OR MORE VIEWS. History: pain, M79.641 Pain in right hand. Comparison: None Findings: Three views of the right hand were obtained. Comminuted, oblique fracture of the distal ulnar diaphysis. No other fractures are seen.     Impression: Impression: Comminuted, oblique fracture of the distal ulnar diaphysis. Electronically signed by:  Laron Hernandez MD  1/11/2018 9:24 AM CST Workstation:  [id number]            ASSESSMENT:    Diagnoses and all orders for this visit:    Closed displaced comminuted fracture of shaft of right ulna with routine healing, subsequent encounter    Struck by horse, subsequent encounter          PLAN  Observation  No Follow-up on file.    Jose G Fernandes MD

## 2018-01-29 RX ORDER — HYDROCODONE BITARTRATE AND ACETAMINOPHEN 7.5; 325 MG/1; MG/1
1 TABLET ORAL EVERY 6 HOURS PRN
Qty: 40 TABLET | Refills: 0 | OUTPATIENT
Start: 2018-01-29

## 2018-01-29 NOTE — TELEPHONE ENCOUNTER
The patient should not be in that severe pain requiring narcotics at this time he has a follow-up appointment and we'll re-x-ray his wrist and make determination he should be on not ibuprofen or Tylenol

## 2018-01-30 DIAGNOSIS — S52.251D CLOSED DISPLACED COMMINUTED FRACTURE OF SHAFT OF RIGHT ULNA WITH ROUTINE HEALING, SUBSEQUENT ENCOUNTER: Primary | ICD-10-CM

## 2018-01-31 ENCOUNTER — OFFICE VISIT (OUTPATIENT)
Dept: ORTHOPEDIC SURGERY | Facility: CLINIC | Age: 53
End: 2018-01-31

## 2018-01-31 VITALS — BODY MASS INDEX: 33.21 KG/M2 | WEIGHT: 232 LBS | HEIGHT: 70 IN

## 2018-01-31 DIAGNOSIS — S52.251D CLOSED DISPLACED COMMINUTED FRACTURE OF SHAFT OF RIGHT ULNA WITH ROUTINE HEALING, SUBSEQUENT ENCOUNTER: Primary | ICD-10-CM

## 2018-01-31 PROCEDURE — 99024 POSTOP FOLLOW-UP VISIT: CPT | Performed by: ORTHOPAEDIC SURGERY

## 2018-01-31 NOTE — PROGRESS NOTES
"The patient is a 52 y.o. male who presents for followup.    Chief Complaint   Patient presents with   • Right Wrist - Follow-up, Fracture       HPI: f/u right ulna fx, patient wearing splint, xrays done today. States the last couple days is really gotten a lot better he is a very rigid splint on and is marked edema of the arm and the area of the wrist from dependency I recommended he get more flexible splint T have to give her a prescription for which we did and he's going to [place] next week total normally he doesn't really is reluctant to have surgery a total knee x-rays haven't changed any and they have not and he's feeling better so just start working on pronation supination and use of the splint recheck back in a month I recommend he see Dr. Garcia    No current outpatient prescriptions on file.    No Known Allergies    ROS:  No fevers or chills.  No nausea or vomiting    PHYSICAL EXAM:    Vitals:    01/31/18 0915   Weight: 105 kg (232 lb)   Height: 177.8 cm (70\")       GAIT:     [x]  Normal  []  Antalgic    Assistive device: [x]  None  []  Walker     []  Crutches  []  Cane     []  Wheelchair  []  Stretcher    Patient is awake and alert, answers questions appropriately, and is in no apparent distress.    Is considerable edema in the hand and fingers but he has a full range of motion without neurovascular deficit recommend elevation more flexible splint they have plenty has was very large and restricts his elbow flexion and rotation        ASSESSMENT:  Diagnoses and all orders for this visit:    Closed displaced comminuted fracture of shaft of right ulna with routine healing, subsequent encounter  -     Miscellaneous DME        PLAN: Mobilize and recheck back in 1 month x-ray    Return in about 1 month (around 2/28/2018).    Jose G Fernandes MD  "

## 2018-02-27 DIAGNOSIS — S52.251D CLOSED DISPLACED COMMINUTED FRACTURE OF SHAFT OF RIGHT ULNA WITH ROUTINE HEALING, SUBSEQUENT ENCOUNTER: Primary | ICD-10-CM

## 2018-02-28 ENCOUNTER — OFFICE VISIT (OUTPATIENT)
Dept: ORTHOPEDIC SURGERY | Facility: CLINIC | Age: 53
End: 2018-02-28

## 2018-02-28 VITALS — HEIGHT: 70 IN | WEIGHT: 231 LBS | BODY MASS INDEX: 33.07 KG/M2

## 2018-02-28 DIAGNOSIS — S52.251D CLOSED DISPLACED COMMINUTED FRACTURE OF SHAFT OF RIGHT ULNA WITH ROUTINE HEALING, SUBSEQUENT ENCOUNTER: Primary | ICD-10-CM

## 2018-02-28 PROCEDURE — 99024 POSTOP FOLLOW-UP VISIT: CPT | Performed by: ORTHOPAEDIC SURGERY

## 2018-02-28 NOTE — PROGRESS NOTES
"The patient is a 52 y.o. male who presents for followup.    Chief Complaint   Patient presents with   • Right Wrist - Follow-up       HPI: Patient is here today for follow up of right ulna fracture. Patient was sent to xray upon arrival. Patient states that he has pain every now and again and some swelling. Lower 6 weeks out really no complaints of pain    No current outpatient prescriptions on file.    No Known Allergies    ROS:  No fevers or chills.  No nausea or vomiting.  No chest pain shortness of breath GI or  problems.  PHYSICAL EXAM:    Vitals:    02/28/18 1616   Weight: 105 kg (231 lb)   Height: 177.8 cm (70\")   PainSc: 0-No pain       GAIT:     []  Normal  []  Antalgic    Assistive device: []  None  []  Walker     []  Crutches  []  Cane     []  Wheelchair  []  Stretcher    Patient is awake and alert, answers questions appropriately, and is in no apparent distress.    Doing well minimal pain and has near full supination but not quite good pronation is noted palpable callus neurologically intact distally.    Xr Wrist 3+ View Right    Result Date: 2/5/2018  Narrative: AP lateral oblique of the right wrist shows a fractured ulna maintained in position slight angled toward deformity and shortening but overall alignment is unchanged      ASSESSMENT:  Diagnoses and all orders for this visit:    Closed displaced comminuted fracture of shaft of right ulna with routine healing, subsequent encounter      He'll continue to work on his range of motion especially supination.  I will see him back in 4 weeks' x-ray arrival if still symptomatic.  I'll go and let him return to work on Monday.  PLAN:No Follow-up on file.    Cedrick Garcia MD  "

## 2018-03-13 ENCOUNTER — TELEPHONE (OUTPATIENT)
Dept: ORTHOPEDIC SURGERY | Facility: CLINIC | Age: 53
End: 2018-03-13

## 2018-03-13 NOTE — TELEPHONE ENCOUNTER
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  THEY ARE NOT ACCEPING THE COMPUTER PRINT OUT.  PATIENT'S WIFE, ELLEN, WILL BE PICKING IT UP.

## 2022-06-21 ENCOUNTER — OFFICE VISIT (OUTPATIENT)
Dept: FAMILY MEDICINE CLINIC | Facility: CLINIC | Age: 57
End: 2022-06-21

## 2022-06-21 VITALS
HEART RATE: 85 BPM | SYSTOLIC BLOOD PRESSURE: 138 MMHG | HEIGHT: 70 IN | TEMPERATURE: 98.2 F | DIASTOLIC BLOOD PRESSURE: 82 MMHG | WEIGHT: 227.2 LBS | BODY MASS INDEX: 32.53 KG/M2 | OXYGEN SATURATION: 98 %

## 2022-06-21 DIAGNOSIS — G47.30 SLEEP APNEA, UNSPECIFIED TYPE: ICD-10-CM

## 2022-06-21 DIAGNOSIS — M62.08 DIASTASIS OF RECTUS ABDOMINIS: ICD-10-CM

## 2022-06-21 DIAGNOSIS — Z12.5 ENCOUNTER FOR PROSTATE CANCER SCREENING: ICD-10-CM

## 2022-06-21 DIAGNOSIS — R73.9 HYPERGLYCEMIA: Primary | ICD-10-CM

## 2022-06-21 DIAGNOSIS — E78.2 MIXED HYPERLIPIDEMIA: ICD-10-CM

## 2022-06-21 PROBLEM — L57.0 ACTINIC KERATOSIS: Status: ACTIVE | Noted: 2019-04-08

## 2022-06-21 PROBLEM — R73.02 IGT (IMPAIRED GLUCOSE TOLERANCE): Status: ACTIVE | Noted: 2019-04-05

## 2022-06-21 PROCEDURE — 99204 OFFICE O/P NEW MOD 45 MIN: CPT | Performed by: FAMILY MEDICINE

## 2022-06-21 NOTE — PROGRESS NOTES
Subjective   Zurdo Rothman is a 56 y.o. male.   Patient is here today accompanied by his wife to establish care.  He has been told in the past that his blood sugar was high and cholesterol is high.  Is been at least 5 years since he has had any lab work done.  He also was concerned about a midline hernia in the abdomen.  He is not having pain but says that he has a bulge from his belly when he raises up and down.    He does have sleep apnea and is using his CPAP but it has been over 5 years since he has had any adjustments made to it.    History of Present Illness    The following portions of the patient's history were reviewed and updated as appropriate: allergies, current medications, past family history, past medical history, past social history, past surgical history and problem list.    Review of Systems   Constitutional: Negative for activity change, appetite change, diaphoresis, fatigue, fever and unexpected weight change.   HENT: Negative for congestion, ear pain, hearing loss, sinus pressure, sore throat, tinnitus, trouble swallowing and voice change.    Eyes: Negative.    Respiratory: Negative.    Cardiovascular: Negative.    Gastrointestinal: Negative for abdominal distention, abdominal pain, blood in stool, constipation, diarrhea, nausea and vomiting.   Endocrine: Negative.    Genitourinary: Negative for decreased urine volume, dysuria, frequency, hematuria and urgency.   Musculoskeletal: Negative.    Skin: Negative.    Allergic/Immunologic: Negative.    Neurological: Negative for dizziness, tremors, seizures, syncope, speech difficulty, weakness, numbness and headaches.   Hematological: Negative.    Psychiatric/Behavioral: Negative for dysphoric mood and sleep disturbance. The patient is not nervous/anxious.    All other systems reviewed and are negative.      Objective    Body mass index is 32.6 kg/m².  Vitals:    06/21/22 1448   BP: 138/82   Pulse: 85   Temp: 98.2 °F (36.8 °C)   TempSrc:  "Tympanic   SpO2: 98%   Weight: 103 kg (227 lb 3.2 oz)   Height: 177.8 cm (70\")       Physical Exam  Vitals and nursing note reviewed.   Constitutional:       General: He is not in acute distress.     Appearance: He is well-developed.   HENT:      Head: Normocephalic and atraumatic.      Nose: Nose normal.   Eyes:      Conjunctiva/sclera: Conjunctivae normal.      Pupils: Pupils are equal, round, and reactive to light.   Neck:      Thyroid: No thyromegaly.      Vascular: No JVD.      Trachea: No tracheal deviation.   Cardiovascular:      Rate and Rhythm: Normal rate and regular rhythm.      Heart sounds: Normal heart sounds. No murmur heard.  Pulmonary:      Effort: Pulmonary effort is normal.      Breath sounds: Normal breath sounds. No wheezing.   Chest:      Chest wall: No tenderness.   Abdominal:      General: Bowel sounds are normal. There is no distension.      Palpations: Abdomen is soft. There is no mass.      Tenderness: There is no abdominal tenderness.      Comments: Rectus diastases is noted   Musculoskeletal:         General: No tenderness. Normal range of motion.      Cervical back: Normal range of motion and neck supple.   Lymphadenopathy:      Cervical: No cervical adenopathy.   Skin:     General: Skin is warm and dry.      Coloration: Skin is not pale.      Findings: No erythema or rash.   Neurological:      Mental Status: He is alert and oriented to person, place, and time.      Motor: No abnormal muscle tone.      Coordination: Coordination normal.         Assessment & Plan   Diagnoses and all orders for this visit:    1. Hyperglycemia (Primary)  -     Hemoglobin A1c    2. Mixed hyperlipidemia  -     T4, Free; Future  -     TSH; Future  -     CBC & Differential; Future  -     Comprehensive Metabolic Panel; Future  -     LDL Cholesterol, Direct; Future    3. Encounter for prostate cancer screening  -     PSA SCREENING; Future    4. Sleep apnea, unspecified type  -     Ambulatory Referral to Sleep " Medicine    5. Diastasis of rectus abdominis    Will get labs including A1c and lipids, as above.  Needs PSA as well.  We will follow up accordingly.    Will refer to sleep medicine for new sleep study and follow-up on sleep apnea.    Give a handout on the diastases rectus and discussed management.  At this point there is no surgical indication but watch for any changes.          This document has been electronically signed by Maria G Ramires MD on June 21, 2022 15:30 CDT

## 2022-06-23 ENCOUNTER — LAB (OUTPATIENT)
Dept: LAB | Facility: OTHER | Age: 57
End: 2022-06-23

## 2022-06-23 DIAGNOSIS — Z12.5 ENCOUNTER FOR PROSTATE CANCER SCREENING: ICD-10-CM

## 2022-06-23 DIAGNOSIS — E78.2 MIXED HYPERLIPIDEMIA: ICD-10-CM

## 2022-06-23 LAB
ALBUMIN SERPL-MCNC: 4.2 G/DL (ref 3.5–5)
ALBUMIN/GLOB SERPL: 1.3 G/DL (ref 1.1–1.8)
ALP SERPL-CCNC: 63 U/L (ref 38–126)
ALT SERPL W P-5'-P-CCNC: 29 U/L
ANION GAP SERPL CALCULATED.3IONS-SCNC: 8 MMOL/L (ref 5–15)
ARTICHOKE IGE QN: 134 MG/DL (ref 0–100)
AST SERPL-CCNC: 23 U/L (ref 17–59)
BASOPHILS # BLD AUTO: 0.04 10*3/MM3 (ref 0–0.2)
BASOPHILS NFR BLD AUTO: 0.6 % (ref 0–1.5)
BILIRUB SERPL-MCNC: 0.5 MG/DL (ref 0.2–1.3)
BUN SERPL-MCNC: 13 MG/DL (ref 7–23)
BUN/CREAT SERPL: 13.5 (ref 7–25)
CALCIUM SPEC-SCNC: 9.2 MG/DL (ref 8.4–10.2)
CHLORIDE SERPL-SCNC: 110 MMOL/L (ref 101–112)
CO2 SERPL-SCNC: 23 MMOL/L (ref 22–30)
CREAT SERPL-MCNC: 0.96 MG/DL (ref 0.7–1.3)
DEPRECATED RDW RBC AUTO: 41.9 FL (ref 37–54)
EGFRCR SERPLBLD CKD-EPI 2021: 92.2 ML/MIN/1.73
EOSINOPHIL # BLD AUTO: 0.29 10*3/MM3 (ref 0–0.4)
EOSINOPHIL NFR BLD AUTO: 4.4 % (ref 0.3–6.2)
ERYTHROCYTE [DISTWIDTH] IN BLOOD BY AUTOMATED COUNT: 13.5 % (ref 12.3–15.4)
GLOBULIN UR ELPH-MCNC: 3.2 GM/DL (ref 2.3–3.5)
GLUCOSE SERPL-MCNC: 117 MG/DL (ref 70–99)
HBA1C MFR BLD: 5.5 % (ref 4.8–5.6)
HCT VFR BLD AUTO: 43.3 % (ref 37.5–51)
HGB BLD-MCNC: 14.1 G/DL (ref 13–17.7)
LYMPHOCYTES # BLD AUTO: 2.34 10*3/MM3 (ref 0.7–3.1)
LYMPHOCYTES NFR BLD AUTO: 35.1 % (ref 19.6–45.3)
MCH RBC QN AUTO: 28.3 PG (ref 26.6–33)
MCHC RBC AUTO-ENTMCNC: 32.6 G/DL (ref 31.5–35.7)
MCV RBC AUTO: 86.9 FL (ref 79–97)
MONOCYTES # BLD AUTO: 0.81 10*3/MM3 (ref 0.1–0.9)
MONOCYTES NFR BLD AUTO: 12.2 % (ref 5–12)
NEUTROPHILS NFR BLD AUTO: 3.18 10*3/MM3 (ref 1.7–7)
NEUTROPHILS NFR BLD AUTO: 47.7 % (ref 42.7–76)
PLATELET # BLD AUTO: 236 10*3/MM3 (ref 140–450)
PMV BLD AUTO: 9 FL (ref 6–12)
POTASSIUM SERPL-SCNC: 4.4 MMOL/L (ref 3.4–5)
PROT SERPL-MCNC: 7.4 G/DL (ref 6.3–8.6)
PSA SERPL-MCNC: 0.37 NG/ML (ref 0–4)
RBC # BLD AUTO: 4.98 10*6/MM3 (ref 4.14–5.8)
SODIUM SERPL-SCNC: 141 MMOL/L (ref 137–145)
T4 FREE SERPL-MCNC: 0.87 NG/DL (ref 0.93–1.7)
TSH SERPL DL<=0.05 MIU/L-ACNC: 2.9 UIU/ML (ref 0.27–4.2)
WBC NRBC COR # BLD: 6.66 10*3/MM3 (ref 3.4–10.8)

## 2022-06-23 PROCEDURE — 83721 ASSAY OF BLOOD LIPOPROTEIN: CPT | Performed by: FAMILY MEDICINE

## 2022-06-23 PROCEDURE — 36415 COLL VENOUS BLD VENIPUNCTURE: CPT | Performed by: FAMILY MEDICINE

## 2022-06-23 PROCEDURE — 80053 COMPREHEN METABOLIC PANEL: CPT | Performed by: FAMILY MEDICINE

## 2022-06-23 PROCEDURE — G0103 PSA SCREENING: HCPCS | Performed by: FAMILY MEDICINE

## 2022-06-23 PROCEDURE — 84439 ASSAY OF FREE THYROXINE: CPT | Performed by: FAMILY MEDICINE

## 2022-06-23 PROCEDURE — 83036 HEMOGLOBIN GLYCOSYLATED A1C: CPT | Performed by: FAMILY MEDICINE

## 2022-06-23 PROCEDURE — 84443 ASSAY THYROID STIM HORMONE: CPT | Performed by: FAMILY MEDICINE

## 2022-06-23 PROCEDURE — 85025 COMPLETE CBC W/AUTO DIFF WBC: CPT | Performed by: FAMILY MEDICINE

## 2022-06-27 RX ORDER — ROSUVASTATIN CALCIUM 5 MG/1
5 TABLET, COATED ORAL DAILY
Qty: 30 TABLET | Refills: 5 | Status: SHIPPED | OUTPATIENT
Start: 2022-06-27 | End: 2023-01-08

## 2022-06-27 NOTE — PROGRESS NOTES
Please call the patient regarding his abnormal result.  Blood sugar is very good, nondiabetic.  His cholesterol is high and I would recommend to start on medication for this.  Tell him I will send in Crestor and would like to recheck labs in 6 months, CMP and LDL

## 2022-06-28 DIAGNOSIS — E78.2 MIXED HYPERLIPIDEMIA: Primary | ICD-10-CM

## 2022-07-28 ENCOUNTER — OFFICE VISIT (OUTPATIENT)
Dept: SLEEP MEDICINE | Facility: HOSPITAL | Age: 57
End: 2022-07-28

## 2022-07-28 VITALS
DIASTOLIC BLOOD PRESSURE: 95 MMHG | SYSTOLIC BLOOD PRESSURE: 160 MMHG | BODY MASS INDEX: 32.64 KG/M2 | WEIGHT: 228 LBS | HEART RATE: 76 BPM | OXYGEN SATURATION: 97 % | HEIGHT: 70 IN

## 2022-07-28 DIAGNOSIS — G47.33 OBSTRUCTIVE SLEEP APNEA, ADULT: Primary | ICD-10-CM

## 2022-07-28 PROCEDURE — 99204 OFFICE O/P NEW MOD 45 MIN: CPT | Performed by: NURSE PRACTITIONER

## 2022-07-28 NOTE — PROGRESS NOTES
New Patient Sleep Medicine Consultation    Encounter Date: 2022         Patient's Primary Care Provider: Maria G Ramires MD  Referring Provider: Maria G Ramires MD  Reason for consultation/chief complaint: known RADHA on CPAP, re-establishing care, needing new machine    Zurdo Rothman is a 56 y.o. male who admits to no RADHA symptoms while on CPAP.    He denies cataplexy, sleep paralysis, or hypnagogic hallucinations. His bedtime is ~ 2200. He  falls asleep after 10-15 minutes, and is up 1-2 times per night. He wakes up ~ 0500. He endorses 7 hours of sleep. He drinks 4 cups of coffee, 3 teas, and 0 sodas per day. He drinks 3 alcoholic beverages per week. He is not a current smoker. He does not take sedatives or hypnotics. He has no sleepiness with driving. He occasionally naps around lunch time.     Patient is in need of a new machine. He has had his current machine for >10 years.    Wayne - 5  Wayne Sleepiness Scale  Sitting and reading: Would never doze  Watching TV: Would never doze  Sitting, inactive in a public place (e.g. a theatre or a meeting): Would never doze  As a passenger in a car for an hour without a break: Moderate chance of dozing  Lying down to rest in the afternoon when circumstances permit: Would never doze  Sitting and talking to someone: Would never doze  Sitting quietly after a lunch without alcohol: High chance of dozing  In a car, while stopped for a few minutes in traffic: Would never doze  Total score: 5    Prior Sleep Testin. Split night PSG on 2012, AHI of 78.5   2. CPAP titration on same day, recommended 8 cm H2O   3. Currently on 8 cm H2O    PAP Data:    No data available  Mask type: Nasal mask (khan)  DME: [facility], switching to [facility] due to machine availability      Past Medical History:   Diagnosis Date   • Acute sinusitis    • Atopic dermatitis    • Breast lump    • History of mammogram 2013    MAMMOGRAM UNILATERAL LT 65501 ([facility]) (1) - W.  "Vincent   • Influenza    • Obesity      Social History     Socioeconomic History   • Marital status:      Spouse name: Sathya   • Number of children: 3   Tobacco Use   • Smoking status: Never Smoker   • Smokeless tobacco: Never Used   Substance and Sexual Activity   • Alcohol use: Yes     Comment: occassional   • Drug use: No   • Sexual activity: Yes     Partners: Female     Family History   Problem Relation Age of Onset   • Cancer Mother    • Heart disease Father      Prior T&A, UPPP, maxillofacial, or bariatric surgery: None  Family history of sleep disorders: None  Other family history + for: As above  Occupation: Construction and maintenance  Marital status:   Smoking history: smoked never    Review of Systems:  Constitutional: negative  Ears, nose, mouth, throat, and face: negative  Respiratory: negative  Cardiovascular: negative  Gastrointestinal: negative  Genitourinary:negative  Musculoskeletal:negative  Neurological: negative  Behavioral/Psych: negative  Patient advised to discuss any positive ROS with PCP.      Vitals:    07/28/22 1558   BP: 160/95   Pulse: 76   SpO2: 97%           07/28/22  1558   Weight: 103 kg (228 lb)       Body mass index is 32.71 kg/m². BMI is >= 30 and <35. (Class 1 Obesity). The following options were offered after discussion;: referral to primary care    Tobacco Use: Low Risk    • Smoking Tobacco Use: Never Smoker   • Smokeless Tobacco Use: Never Used     Physical Exam:        General: Alert. Cooperative. Well developed. No acute distress.   Head/Neck:  Normocephalic. Symmetrical. Atraumatic.     Neck circumference: 18\"             Eyes: Sclera clear. No icterus. PERRLA. Normal EOM.             Ears: No deformities. Normal hearing.             Nose: No septal deviation. No drainage.          Throat: No oral lesions. No thrush. Moist mucous membranes. Trachea midline    Tongue is normal     Dentition is fair       Pharynx: Posterior pharyngeal pillars are " narrow    Mallampati score of III (soft and hard palate and base of uvula visible)    Pharynx is nonerythematous, elongated uvula   Chest Wall:  Normal shape. Symmetric expansion with respiration. No tenderness.          Lungs:  Clear to auscultation bilaterally. No wheezes. No rhonchi. No rales. Respirations regular, even and unlabored.            Heart:  Regular rhythm and normal rate. Normal S1 and S2. No murmur.     Abdomen:  Soft, non-tender and non-distended. Normal bowel sounds. No masses.  Extremities:  Moves all extremities well. No edema.           Pulses: Pulses palpable and equal bilaterally.               Skin: Dry. Intact. No bleeding. No rash.           Neuro: Moves all 4 extremities and cranial nerves grossly intact.  Psychiatric: Normal mood and affect.      Current Outpatient Medications:   •  rosuvastatin (Crestor) 5 MG tablet, Take 1 tablet by mouth Daily., Disp: 30 tablet, Rfl: 5    WBC   Date Value Ref Range Status   06/23/2022 6.66 3.40 - 10.80 10*3/mm3 Final     RBC   Date Value Ref Range Status   06/23/2022 4.98 4.14 - 5.80 10*6/mm3 Final     Hemoglobin   Date Value Ref Range Status   06/23/2022 14.1 13.0 - 17.7 g/dL Final     Hematocrit   Date Value Ref Range Status   06/23/2022 43.3 37.5 - 51.0 % Final     MCV   Date Value Ref Range Status   06/23/2022 86.9 79.0 - 97.0 fL Final     MCH   Date Value Ref Range Status   06/23/2022 28.3 26.6 - 33.0 pg Final     MCHC   Date Value Ref Range Status   06/23/2022 32.6 31.5 - 35.7 g/dL Final     RDW   Date Value Ref Range Status   06/23/2022 13.5 12.3 - 15.4 % Final     RDW-SD   Date Value Ref Range Status   06/23/2022 41.9 37.0 - 54.0 fl Final     MPV   Date Value Ref Range Status   06/23/2022 9.0 6.0 - 12.0 fL Final     Platelets   Date Value Ref Range Status   06/23/2022 236 140 - 450 10*3/mm3 Final     Neutrophil %   Date Value Ref Range Status   06/23/2022 47.7 42.7 - 76.0 % Final     Lymphocyte %   Date Value Ref Range Status   06/23/2022 35.1  19.6 - 45.3 % Final     Monocyte %   Date Value Ref Range Status   06/23/2022 12.2 (H) 5.0 - 12.0 % Final     Eosinophil %   Date Value Ref Range Status   06/23/2022 4.4 0.3 - 6.2 % Final     Basophil %   Date Value Ref Range Status   06/23/2022 0.6 0.0 - 1.5 % Final     Neutrophils, Absolute   Date Value Ref Range Status   06/23/2022 3.18 1.70 - 7.00 10*3/mm3 Final     Lymphocytes, Absolute   Date Value Ref Range Status   06/23/2022 2.34 0.70 - 3.10 10*3/mm3 Final     Monocytes, Absolute   Date Value Ref Range Status   06/23/2022 0.81 0.10 - 0.90 10*3/mm3 Final     Eosinophils, Absolute   Date Value Ref Range Status   06/23/2022 0.29 0.00 - 0.40 10*3/mm3 Final     Basophils, Absolute   Date Value Ref Range Status   06/23/2022 0.04 0.00 - 0.20 10*3/mm3 Final     Lab Results   Component Value Date    GLUCOSE 117 (H) 06/23/2022    BUN 13 06/23/2022    CREATININE 0.96 06/23/2022    EGFRIFNONA 78 02/07/2017    BCR 13.5 06/23/2022    K 4.4 06/23/2022    CO2 23.0 06/23/2022    CALCIUM 9.2 06/23/2022    ALBUMIN 4.20 06/23/2022    AST 23 06/23/2022    ALT 29 06/23/2022       ASSESSMENT:  1. Obstructive sleep apnea - New (to me), no additional work-up planned (3)  1. Currently compliant with CPAP therapy  2. Script for new CPAP @ 8 cm H2O with nasal mask per patient choice ([facility])  3. Follow up in 30-90 days with compliance report, or sooner if trouble with PAP adaptation      I spent 30 minutes caring for Zurdo on this date of service. This time includes time spent by me in the following activities: preparing for the visit, reviewing tests, obtaining and/or reviewing a separately obtained history, performing a medically appropriate examination and/or evaluation , counseling and educating the patient/family/caregiver, documenting information in the medical record and care coordination; discussing PAP therapy, PAP compliance and PAP maintenance    Patient to follow up in 31-90 days with compliance report. Patient agrees to  return sooner if changes in symptoms.         This document has been electronically signed by BROOKLYN Mancilla on July 28, 2022 16:02 CDT          CC: Maria G Ramires MD Chappell, Kristy, MD

## 2022-08-02 ENCOUNTER — OFFICE VISIT (OUTPATIENT)
Dept: FAMILY MEDICINE CLINIC | Facility: CLINIC | Age: 57
End: 2022-08-02

## 2022-08-02 VITALS
HEART RATE: 82 BPM | DIASTOLIC BLOOD PRESSURE: 80 MMHG | HEIGHT: 70 IN | OXYGEN SATURATION: 98 % | SYSTOLIC BLOOD PRESSURE: 132 MMHG | BODY MASS INDEX: 32.64 KG/M2 | WEIGHT: 228 LBS

## 2022-08-02 DIAGNOSIS — M25.562 CHRONIC PAIN OF LEFT KNEE: Primary | ICD-10-CM

## 2022-08-02 DIAGNOSIS — G89.29 CHRONIC PAIN OF LEFT KNEE: Primary | ICD-10-CM

## 2022-08-02 PROCEDURE — 20610 DRAIN/INJ JOINT/BURSA W/O US: CPT | Performed by: FAMILY MEDICINE

## 2022-08-02 RX ORDER — TRIAMCINOLONE ACETONIDE 40 MG/ML
80 INJECTION, SUSPENSION INTRA-ARTICULAR; INTRAMUSCULAR ONCE
Status: COMPLETED | OUTPATIENT
Start: 2022-08-02 | End: 2022-08-02

## 2022-08-02 RX ADMIN — TRIAMCINOLONE ACETONIDE 80 MG: 40 INJECTION, SUSPENSION INTRA-ARTICULAR; INTRAMUSCULAR at 15:57

## 2022-08-02 NOTE — PROGRESS NOTES
Subjective   Zurdo Rothman is a 56 y.o. male.   Patient here today with pain in the left knee.  He has had some pain off and on and even had a fall last year on the knee but for the past few weeks the pain has been worse and he is having some anterior swelling, difficulty bearing weight.    Knee Pain   The incident occurred more than 1 week ago. The pain is present in the left knee. The pain is at a severity of 6/10. The pain is moderate. The pain has been constant since onset. Associated symptoms include a loss of motion. Pertinent negatives include no numbness. He reports no foreign bodies present. The symptoms are aggravated by movement and weight bearing. He has tried acetaminophen, NSAIDs and rest for the symptoms. The treatment provided no relief.       The following portions of the patient's history were reviewed and updated as appropriate: allergies, current medications, past family history, past medical history, past social history, past surgical history and problem list.    Review of Systems   Constitutional: Negative for activity change, appetite change, diaphoresis, fatigue, fever and unexpected weight change.   HENT: Negative for congestion, ear pain, hearing loss, sinus pressure, sore throat, tinnitus, trouble swallowing and voice change.    Eyes: Negative.    Respiratory: Negative.    Cardiovascular: Negative.    Gastrointestinal: Negative for abdominal distention, abdominal pain, blood in stool, constipation, diarrhea, nausea and vomiting.   Endocrine: Negative.    Genitourinary: Negative for decreased urine volume, dysuria, frequency, hematuria and urgency.   Musculoskeletal: Negative.    Skin: Negative.    Allergic/Immunologic: Negative.    Neurological: Negative for dizziness, tremors, seizures, syncope, speech difficulty, weakness, numbness and headaches.   Hematological: Negative.    Psychiatric/Behavioral: Negative for dysphoric mood and sleep disturbance. The patient is not  "nervous/anxious.    All other systems reviewed and are negative.      Objective    Body mass index is 32.71 kg/m².  Vitals:    08/02/22 1457   BP: 132/80   Pulse: 82   SpO2: 98%   Weight: 103 kg (228 lb)   Height: 177.8 cm (70\")       Physical Exam  Vitals and nursing note reviewed.   Constitutional:       General: He is not in acute distress.     Appearance: He is well-developed.   HENT:      Head: Normocephalic and atraumatic.      Nose: Nose normal.   Eyes:      Conjunctiva/sclera: Conjunctivae normal.      Pupils: Pupils are equal, round, and reactive to light.   Neck:      Thyroid: No thyromegaly.      Vascular: No JVD.      Trachea: No tracheal deviation.   Cardiovascular:      Rate and Rhythm: Normal rate and regular rhythm.      Heart sounds: Normal heart sounds. No murmur heard.  Pulmonary:      Effort: Pulmonary effort is normal.      Breath sounds: Normal breath sounds. No wheezing.   Chest:      Chest wall: No tenderness.   Abdominal:      General: Bowel sounds are normal. There is no distension.      Palpations: Abdomen is soft. There is no mass.      Tenderness: There is no abdominal tenderness.   Musculoskeletal:         General: No tenderness. Normal range of motion.      Cervical back: Normal range of motion and neck supple.      Comments: Swelling anterior knee noted      After anesthesia with 1% lidocaine locally at the needle insertion site, the left knee is prepped, cleaned with Betadine, and draped in a sterile manner.  Aspiration of the knee is attempted.  20 cc of clear straw-colored fluid is aspirated.  With the needle still in place, syringe is changed and injection into the knee joint with Kenalog 40 mg per mL, 2 mls and lidocaine 1%, 1 mL is given. Patient tolerated procedure well.       Lymphadenopathy:      Cervical: No cervical adenopathy.   Skin:     General: Skin is warm and dry.      Coloration: Skin is not pale.      Findings: No erythema or rash.   Neurological:      Mental " Status: He is alert and oriented to person, place, and time.      Motor: No abnormal muscle tone.      Coordination: Coordination normal.       Study Result    Narrative & Impression   Procedure:  Left knee         Indication:  Left knee pain   .     Technique:  3 views   .     Prior relevant exam:  None.       No evidence of acute bony, soft tissue, or joint abnormality is  noted. Bone mineralization is within normal limits. The  visualized joint spaces appear intact. Normal left knee.        IMPRESSION:  1.  Normal left knee.        Electronically signed by:  Raymond Trevino MD  8/2/2022 4:05 PM CDT  Workstation: [id number]         Assessment & Plan   Diagnoses and all orders for this visit:    1. Chronic pain of left knee (Primary)  -     XR Knee 3 View Left; Future  -     triamcinolone acetonide (KENALOG-40) injection 80 mg    Knee joint aspirated, injected as above.  If this helps relieve symptoms advised patient that we may be able to repeat this in 3 to 4 months.  If it does not help or symptoms return consider MRI.          This document has been electronically signed by Maria G Ramires MD on August 2, 2022 15:24 CDT

## 2022-08-22 ENCOUNTER — TELEPHONE (OUTPATIENT)
Dept: FAMILY MEDICINE CLINIC | Facility: CLINIC | Age: 57
End: 2022-08-22

## 2022-08-22 DIAGNOSIS — G89.29 CHRONIC PAIN OF LEFT KNEE: Primary | ICD-10-CM

## 2022-08-22 DIAGNOSIS — M25.562 CHRONIC PAIN OF LEFT KNEE: Primary | ICD-10-CM

## 2022-09-08 ENCOUNTER — TELEPHONE (OUTPATIENT)
Dept: FAMILY MEDICINE CLINIC | Facility: CLINIC | Age: 57
End: 2022-09-08

## 2022-09-08 NOTE — TELEPHONE ENCOUNTER
----- Message from Maria G Ramires MD sent at 9/8/2022  4:28 PM CDT -----  Can you let patient or his wife know MRI was denied by his insurance.  They probably want him to do physical therapy first.  Would he want to do this?  ----- Message -----  From: Vanessa Izquierdo LPN  Sent: 9/8/2022   4:05 PM CDT  To: Maria G Ramires MD    I spoke to MRI and they said it had been denied.      ----- Message -----  From: Maria G Ramires MD  Sent: 9/6/2022   4:14 PM CDT  To: Vanessa Izquierdo LPN    I see where you put the MRI order in 2 weeks ago but the patient still has not been called.  Would you check on this please for him and let him know?  Thank you.

## 2022-10-05 ENCOUNTER — PROCEDURE VISIT (OUTPATIENT)
Dept: FAMILY MEDICINE CLINIC | Facility: CLINIC | Age: 57
End: 2022-10-05

## 2022-10-05 VITALS
HEIGHT: 70 IN | OXYGEN SATURATION: 98 % | TEMPERATURE: 99.5 F | DIASTOLIC BLOOD PRESSURE: 88 MMHG | BODY MASS INDEX: 33.18 KG/M2 | HEART RATE: 87 BPM | WEIGHT: 231.8 LBS | SYSTOLIC BLOOD PRESSURE: 134 MMHG

## 2022-10-05 DIAGNOSIS — G89.29 CHRONIC PAIN OF LEFT KNEE: Primary | ICD-10-CM

## 2022-10-05 DIAGNOSIS — M25.562 CHRONIC PAIN OF LEFT KNEE: Primary | ICD-10-CM

## 2022-10-05 PROCEDURE — 20610 DRAIN/INJ JOINT/BURSA W/O US: CPT | Performed by: FAMILY MEDICINE

## 2022-10-05 RX ORDER — TRIAMCINOLONE ACETONIDE 40 MG/ML
80 INJECTION, SUSPENSION INTRA-ARTICULAR; INTRAMUSCULAR ONCE
Status: COMPLETED | OUTPATIENT
Start: 2022-10-05 | End: 2022-10-05

## 2022-10-05 RX ADMIN — TRIAMCINOLONE ACETONIDE 80 MG: 40 INJECTION, SUSPENSION INTRA-ARTICULAR; INTRAMUSCULAR at 15:19

## 2022-10-05 NOTE — PROGRESS NOTES
Procedure   Procedures      After anesthesia with 1% lidocaine locally at the needle insertion site, the left knee is prepped, cleaned with Betadine, and draped in a sterile manner.  Aspiration of the knee is attempted.  15 cc of clear straw-colored fluid is aspirated.  With the needle still in place, syringe is changed and injection into the knee joint with Kenalog 40 mg per mL, 2 mls and lidocaine 1%, 1 mL is given. Patient tolerated procedure well.          This document has been electronically signed by Maria G Ramires MD on October 5, 2022 15:18 CDT

## 2022-10-27 ENCOUNTER — OFFICE VISIT (OUTPATIENT)
Dept: SLEEP MEDICINE | Facility: HOSPITAL | Age: 57
End: 2022-10-27

## 2022-10-27 ENCOUNTER — TELEPHONE (OUTPATIENT)
Dept: FAMILY MEDICINE CLINIC | Facility: CLINIC | Age: 57
End: 2022-10-27

## 2022-10-27 VITALS
HEART RATE: 86 BPM | DIASTOLIC BLOOD PRESSURE: 97 MMHG | BODY MASS INDEX: 32.93 KG/M2 | SYSTOLIC BLOOD PRESSURE: 142 MMHG | HEIGHT: 70 IN | OXYGEN SATURATION: 95 % | WEIGHT: 230 LBS

## 2022-10-27 DIAGNOSIS — G47.33 OBSTRUCTIVE SLEEP APNEA: Primary | ICD-10-CM

## 2022-10-27 DIAGNOSIS — G89.29 CHRONIC PAIN OF LEFT KNEE: Primary | ICD-10-CM

## 2022-10-27 DIAGNOSIS — M25.562 CHRONIC PAIN OF LEFT KNEE: Primary | ICD-10-CM

## 2022-10-27 PROCEDURE — 99213 OFFICE O/P EST LOW 20 MIN: CPT | Performed by: NURSE PRACTITIONER

## 2022-10-27 NOTE — PROGRESS NOTES
Sleep Clinic Follow Up    Date: 10/27/2022  Primary Care Provider: Maria G Ramires MD    Last office visit: 2022 (I reviewed this note)    CC: Follow up: RADHA on CPAP, new machine follow up      Interim History:  Since the last visit:    1) severe RADHA -  Zurdo Rothman has remained compliant with CPAP. He denies mask and machine issues, dry mouth, headaches, or pressures intolerance. He denies abnormal dreams, sleep paralysis, nasal congestion, URI sx.    2) Patient denies RLS symptoms.     Sleep Testin. Split night PSG on 2012, AHI of 78.5   2. CPAP titration on same day, recommended 8 cm H2O   3. Currently on 8 cm H2O    PAP Data:    Time frame: 2022-10/21/2022   Compliance: 93.2%  Average use on days used: 8 hrs 12 min  Percent of days with usage greater than or equal to 4 hours: 93.2%  PAP range: 8 cm H2O  Average 90% pressure: 8 cmH2O  Leak: 1 minutes  Average AHI: 0.2 events/hr  Mask type: Nasal mask   DME: [facility]  Machine type: 3B Medical Josey II    Bed time: 0473-0512  Sleep latency: 30 minutes  Number of times awakens during the night: 2-3  Wake time: 0500  Estimated total sleep time at night: 6 hours  Caffeine intake: 3 cups of coffee, 3-4 cups of tea, and 0 sodas per day  Alcohol intake: 0 drinks per week  Nap time: rare after lunch   Sleepiness with Driving: none     [place] - 7  [place] Sleepiness Scale  Sitting and reading: Slight chance of dozing  Watching TV: Would never doze  Sitting, inactive in a public place (e.g. a theatre or a meeting): Slight chance of dozing  As a passenger in a car for an hour without a break: Moderate chance of dozing  Lying down to rest in the afternoon when circumstances permit: Would never doze  Sitting and talking to someone: Slight chance of dozing  Sitting quietly after a lunch without alcohol: Moderate chance of dozing  In a car, while stopped for a few minutes in traffic: Would never doze  Total score: 7    PMHx, FH, SH reviewed and  pertinent changes are: Had needle aspiration of fluid from knee. Upcoming MRI.       Review of Systems:   Negative for chest pain, SOA, fever, chills, cough, N/V/D, abdominal pain.    Smoking:none    Zurdo Rothman  reports that he has never smoked. He has never used smokeless tobacco.    Physical Exam:  Vitals:    10/27/22 1529   BP: 142/97   Pulse: 86   SpO2: 95%           10/27/22  1529   Weight: 104 kg (230 lb)     Body mass index is 33 kg/m². BMI is >= 30 and <35. (Class 1 Obesity). The following options were offered after discussion;: referral to primary care    Gen:                No distress, conversant, pleasant, appears stated age, alert, oriented  Eyes:               Anicteric sclera, moist conjunctiva, no lid lag                           PERRL, EOMI   Heent:             NC/AT                          Oropharynx clear                          Normal hearing  Lungs:             Normal effort, non-labored breathing        CV:                  Normal S1/S2                          No lower extremity edema  ABD:               Soft, rounded, non-distended                 Psych:             Appropriate affect  Neuro:             CN 2-12 appear intact    Past Medical History:   Diagnosis Date   • Acute sinusitis    • Atopic dermatitis    • Breast lump    • History of mammogram 06/27/2013    MAMMOGRAM UNILATERAL LT 80687 ([facility]) (1) - ELEAZAR Santos   • Influenza    • Obesity        Current Outpatient Medications:   •  rosuvastatin (Crestor) 5 MG tablet, Take 1 tablet by mouth Daily., Disp: 30 tablet, Rfl: 5    WBC   Date Value Ref Range Status   06/23/2022 6.66 3.40 - 10.80 10*3/mm3 Final     RBC   Date Value Ref Range Status   06/23/2022 4.98 4.14 - 5.80 10*6/mm3 Final     Hemoglobin   Date Value Ref Range Status   06/23/2022 14.1 13.0 - 17.7 g/dL Final     Hematocrit   Date Value Ref Range Status   06/23/2022 43.3 37.5 - 51.0 % Final     MCV   Date Value Ref Range Status   06/23/2022 86.9 79.0 - 97.0 fL  Final     MCH   Date Value Ref Range Status   06/23/2022 28.3 26.6 - 33.0 pg Final     MCHC   Date Value Ref Range Status   06/23/2022 32.6 31.5 - 35.7 g/dL Final     RDW   Date Value Ref Range Status   06/23/2022 13.5 12.3 - 15.4 % Final     RDW-SD   Date Value Ref Range Status   06/23/2022 41.9 37.0 - 54.0 fl Final     MPV   Date Value Ref Range Status   06/23/2022 9.0 6.0 - 12.0 fL Final     Platelets   Date Value Ref Range Status   06/23/2022 236 140 - 450 10*3/mm3 Final     Neutrophil %   Date Value Ref Range Status   06/23/2022 47.7 42.7 - 76.0 % Final     Lymphocyte %   Date Value Ref Range Status   06/23/2022 35.1 19.6 - 45.3 % Final     Monocyte %   Date Value Ref Range Status   06/23/2022 12.2 (H) 5.0 - 12.0 % Final     Eosinophil %   Date Value Ref Range Status   06/23/2022 4.4 0.3 - 6.2 % Final     Basophil %   Date Value Ref Range Status   06/23/2022 0.6 0.0 - 1.5 % Final     Neutrophils, Absolute   Date Value Ref Range Status   06/23/2022 3.18 1.70 - 7.00 10*3/mm3 Final     Lymphocytes, Absolute   Date Value Ref Range Status   06/23/2022 2.34 0.70 - 3.10 10*3/mm3 Final     Monocytes, Absolute   Date Value Ref Range Status   06/23/2022 0.81 0.10 - 0.90 10*3/mm3 Final     Eosinophils, Absolute   Date Value Ref Range Status   06/23/2022 0.29 0.00 - 0.40 10*3/mm3 Final     Basophils, Absolute   Date Value Ref Range Status   06/23/2022 0.04 0.00 - 0.20 10*3/mm3 Final       Lab Results   Component Value Date    GLUCOSE 117 (H) 06/23/2022    BUN 13 06/23/2022    CREATININE 0.96 06/23/2022    EGFRIFNONA 78 02/07/2017    BCR 13.5 06/23/2022    K 4.4 06/23/2022    CO2 23.0 06/23/2022    CALCIUM 9.2 06/23/2022    ALBUMIN 4.20 06/23/2022    AST 23 06/23/2022    ALT 29 06/23/2022       Assessment and Plan:    1. Obstructive sleep apnea - Established, stable (1)  1. Compliant with PAP therapy  2. Continue PAP as prescribed  3. Script for PAP supplies  4. Return to clinic in 1 year with compliance report unless  change in symptoms in interim period      I spent 15 minutes caring for Zurdo on this date of service. This time includes time spent by me in the following activities: preparing for the visit, reviewing tests, obtaining and/or reviewing a separately obtained history, performing a medically appropriate examination and/or evaluation , counseling and educating the patient/family/caregiver, documenting information in the medical record and care coordination; discussing PAP therapy, PAP compliance and PAP maintenance    RTC in 12 months. Patient agrees to return sooner if changes in symptoms.        This document has been electronically signed by BROOKLYN Mancilla on October 27, 2022 15:32 CDT          CC: Maria G Ramires MD          No ref. provider found

## 2022-11-07 ENCOUNTER — TELEPHONE (OUTPATIENT)
Dept: FAMILY MEDICINE CLINIC | Facility: CLINIC | Age: 57
End: 2022-11-07

## 2022-11-07 NOTE — TELEPHONE ENCOUNTER
He is needing a ref to dr de la paz for left knee pain.  Can call his wife at [phone] with appt.

## 2022-11-08 DIAGNOSIS — M25.562 CHRONIC PAIN OF LEFT KNEE: Primary | ICD-10-CM

## 2022-11-08 DIAGNOSIS — G89.29 CHRONIC PAIN OF LEFT KNEE: Primary | ICD-10-CM

## 2022-12-08 ENCOUNTER — OFFICE VISIT (OUTPATIENT)
Dept: ORTHOPEDIC SURGERY | Facility: CLINIC | Age: 57
End: 2022-12-08

## 2022-12-08 VITALS — BODY MASS INDEX: 32.78 KG/M2 | WEIGHT: 229 LBS | HEIGHT: 70 IN

## 2022-12-08 DIAGNOSIS — M25.562 ACUTE PAIN OF LEFT KNEE: ICD-10-CM

## 2022-12-08 DIAGNOSIS — M23.92 INTERNAL DERANGEMENT OF LEFT KNEE: Primary | ICD-10-CM

## 2022-12-08 DIAGNOSIS — G47.33 OBSTRUCTIVE SLEEP APNEA, ADULT: ICD-10-CM

## 2022-12-08 PROBLEM — G89.29 CHRONIC PAIN OF LEFT KNEE: Status: ACTIVE | Noted: 2022-12-08

## 2022-12-08 PROCEDURE — 99204 OFFICE O/P NEW MOD 45 MIN: CPT | Performed by: ORTHOPAEDIC SURGERY

## 2022-12-08 RX ORDER — MELOXICAM 15 MG/1
TABLET ORAL
Qty: 30 TABLET | Refills: 3 | Status: SHIPPED | OUTPATIENT
Start: 2022-12-08 | End: 2023-01-13

## 2022-12-08 NOTE — PROGRESS NOTES
Zurdo Rothman is a 56 y.o. male   Primary provider:  Maria G Ramires MD       Chief Complaint   Patient presents with   • Left Knee - Knee Pain       HISTORY OF PRESENT ILLNESS:   Left knee pain started about 6 months ago.   Patient states that he has been having pain in his left knee for approximately 1 year.  Pain has been especially worse over the last 6 months.  He has had an aspiration of the knee twice and an intra-articular steroid injection.  He reports mostly dull aches but he does have sharp stabbing pains with pivoting and twisting and with deep knee bends.  He has been to physical therapy 6-7 visits which made his pain worse.  He has pain with activities of daily living and with attempts at increased activity.    Knee Pain   There was no injury mechanism. The pain is present in the left knee. Quality: GRINDING. The pain is severe. The pain has been intermittent since onset. Associated symptoms comments: POPPING, SWELLING. . He reports no foreign bodies present. Exacerbated by: DRIVING. WALKING.  Treatments tried: physical therapy at [facility].         CONCURRENT MEDICAL HISTORY:    Past Medical History:   Diagnosis Date   • Acute sinusitis    • Atopic dermatitis    • Breast lump    • History of mammogram 06/27/2013    MAMMOGRAM UNILATERAL LT 65903 ([facility]) (1) - ELEAZAR Santos   • Influenza    • Obesity        No Known Allergies      Current Outpatient Medications:   •  rosuvastatin (Crestor) 5 MG tablet, Take 1 tablet by mouth Daily., Disp: 30 tablet, Rfl: 5  •  meloxicam (MOBIC) 15 MG tablet, 1 PO Daily with food., Disp: 30 tablet, Rfl: 3    Past Surgical History:   Procedure Laterality Date   • COLONOSCOPY N/A 3/29/2017    Procedure: COLONOSCOPY;  Surgeon: David Monge MD;  Location: [facility] ENDOSCOPY;  Service:    • HAND SURGERY Right     hand pinned back together    • INJECTION OF MEDICATION  11/14/2014    Depo Medrol (Methylprednisone) 80mg (3)  - WOfelia Santos       Family History   Problem  "Relation Age of Onset   • Cancer Mother    • Heart disease Father        Social History     Socioeconomic History   • Marital status:      Spouse name: Sathya   • Number of children: 3   Tobacco Use   • Smoking status: Never   • Smokeless tobacco: Never   Substance and Sexual Activity   • Alcohol use: Yes     Comment: occassional   • Drug use: No   • Sexual activity: Yes     Partners: Female        Review of Systems   Constitutional: Negative.    HENT: Negative.    Eyes: Negative.    Respiratory: Negative.    Cardiovascular: Negative.    Gastrointestinal: Negative.    Endocrine: Negative.    Genitourinary: Negative.    Musculoskeletal:        Left knee pain   Skin: Negative.    Allergic/Immunologic: Negative.    Neurological: Negative.    Hematological: Negative.    Psychiatric/Behavioral: Negative.        PHYSICAL EXAMINATION:       Ht 177.8 cm (70\")   Wt 104 kg (229 lb)   BMI 32.86 kg/m²     Physical Exam  Constitutional:       General: He is not in acute distress.     Appearance: Normal appearance.   Pulmonary:      Effort: Pulmonary effort is normal. No respiratory distress.   Neurological:      Mental Status: He is alert and oriented to person, place, and time.         GAIT:     []  Normal  [x]  Antalgic    Assistive device: [x]  None  []  Walker     []  Crutches  []  Cane     []  Wheelchair  []  Stretcher    Left Knee Exam     Comments:  He has good range of motion.  He has good stability on exam.  He has sharp medial joint line tenderness.  Positive Snow test.  Good distal pulses and sensation.  Good muscle tone and strength.              Study Result    Narrative & Impression   Procedure:  Left knee         Indication:  Left knee pain   .     Technique:  3 views   .     Prior relevant exam:  None.       No evidence of acute bony, soft tissue, or joint abnormality is  noted. Bone mineralization is within normal limits. The  visualized joint spaces appear intact. Normal left " knee.        IMPRESSION:  1.  Normal left knee.        Electronically signed by:  Raymond Trevino MD  8/2/2022 4:05 PM CDT  Workstation: [id number]           XR Knee 3 View Left  Order date: 8/2/2022  Authorizing: Maria G Ramires MD  Ordered by Maria G Ramires MD on 8/2/2022.     Narrative & Impression    Procedure:  Left knee         Indication:  Left knee pain   .     Technique:  3 views   .     Prior relevant exam:  None.       No evidence of acute bony, soft tissue, or joint abnormality is  noted. Bone mineralization is within normal limits. The  visualized joint spaces appear intact. Normal left knee.        IMPRESSION:  1.  Normal left knee.        Electronically signed by:  Raymond Trevino MD  8/2/2022 4:05 PM CDT  Workstation: [id number]           ASSESSMENT:    Diagnoses and all orders for this visit:    Internal derangement of left knee  -     MRI Knee Left Without Contrast; Future    Acute pain of left knee  -     MRI Knee Left Without Contrast; Future    Obstructive sleep apnea, adult    Other orders  -     meloxicam (MOBIC) 15 MG tablet; 1 PO Daily with food.          PLAN    The patient has been having worsening pain over the last year but especially over the last 6 months.  He has mechanical symptoms and has exam findings consistent with possible meniscal tear.  He has been to physical therapy and had intra-articular steroid injections without lasting improvement.  Plan is to proceed with MRI of the left knee.  We also discussed beginning meloxicam for anti-inflammatory usage.  Follow-up after the MRI to discuss definitive treatment options.    Return for recheck for MRI results.    Jose G Quiros MD

## 2022-12-13 ENCOUNTER — OFFICE VISIT (OUTPATIENT)
Dept: FAMILY MEDICINE CLINIC | Facility: CLINIC | Age: 57
End: 2022-12-13

## 2022-12-13 ENCOUNTER — LAB (OUTPATIENT)
Dept: LAB | Facility: OTHER | Age: 57
End: 2022-12-13

## 2022-12-13 VITALS
HEART RATE: 70 BPM | SYSTOLIC BLOOD PRESSURE: 162 MMHG | HEIGHT: 70 IN | OXYGEN SATURATION: 99 % | DIASTOLIC BLOOD PRESSURE: 99 MMHG | TEMPERATURE: 98.6 F | WEIGHT: 232.8 LBS | BODY MASS INDEX: 33.33 KG/M2 | RESPIRATION RATE: 16 BRPM

## 2022-12-13 DIAGNOSIS — E78.2 MIXED HYPERLIPIDEMIA: ICD-10-CM

## 2022-12-13 DIAGNOSIS — I10 ESSENTIAL HYPERTENSION: Primary | ICD-10-CM

## 2022-12-13 DIAGNOSIS — I10 ESSENTIAL HYPERTENSION: ICD-10-CM

## 2022-12-13 LAB
ALBUMIN SERPL-MCNC: 4.4 G/DL (ref 3.5–5)
ALBUMIN/GLOB SERPL: 1.4 G/DL (ref 1.1–1.8)
ALP SERPL-CCNC: 69 U/L (ref 38–126)
ALT SERPL W P-5'-P-CCNC: 41 U/L
ANION GAP SERPL CALCULATED.3IONS-SCNC: 9 MMOL/L (ref 5–15)
AST SERPL-CCNC: 30 U/L (ref 17–59)
BASOPHILS # BLD AUTO: 0.04 10*3/MM3 (ref 0–0.2)
BASOPHILS NFR BLD AUTO: 0.6 % (ref 0–1.5)
BILIRUB SERPL-MCNC: 0.7 MG/DL (ref 0.2–1.3)
BUN SERPL-MCNC: 13 MG/DL (ref 7–23)
BUN/CREAT SERPL: 13.4 (ref 7–25)
CALCIUM SPEC-SCNC: 9.2 MG/DL (ref 8.4–10.2)
CHLORIDE SERPL-SCNC: 105 MMOL/L (ref 101–112)
CHOLEST SERPL-MCNC: 176 MG/DL (ref 150–200)
CO2 SERPL-SCNC: 25 MMOL/L (ref 22–30)
CREAT SERPL-MCNC: 0.97 MG/DL (ref 0.7–1.3)
DEPRECATED RDW RBC AUTO: 41.5 FL (ref 37–54)
EGFRCR SERPLBLD CKD-EPI 2021: 91.1 ML/MIN/1.73
EOSINOPHIL # BLD AUTO: 0.3 10*3/MM3 (ref 0–0.4)
EOSINOPHIL NFR BLD AUTO: 4.7 % (ref 0.3–6.2)
ERYTHROCYTE [DISTWIDTH] IN BLOOD BY AUTOMATED COUNT: 13.1 % (ref 12.3–15.4)
GLOBULIN UR ELPH-MCNC: 3.2 GM/DL (ref 2.3–3.5)
GLUCOSE SERPL-MCNC: 109 MG/DL (ref 70–99)
HCT VFR BLD AUTO: 45.3 % (ref 37.5–51)
HDLC SERPL-MCNC: 50 MG/DL (ref 40–59)
HGB BLD-MCNC: 14.9 G/DL (ref 13–17.7)
LDLC SERPL CALC-MCNC: 97 MG/DL
LDLC/HDLC SERPL: 1.85 {RATIO} (ref 0–3.55)
LYMPHOCYTES # BLD AUTO: 2.35 10*3/MM3 (ref 0.7–3.1)
LYMPHOCYTES NFR BLD AUTO: 36.5 % (ref 19.6–45.3)
MCH RBC QN AUTO: 28.8 PG (ref 26.6–33)
MCHC RBC AUTO-ENTMCNC: 32.9 G/DL (ref 31.5–35.7)
MCV RBC AUTO: 87.5 FL (ref 79–97)
MONOCYTES # BLD AUTO: 0.82 10*3/MM3 (ref 0.1–0.9)
MONOCYTES NFR BLD AUTO: 12.8 % (ref 5–12)
NEUTROPHILS NFR BLD AUTO: 2.92 10*3/MM3 (ref 1.7–7)
NEUTROPHILS NFR BLD AUTO: 45.4 % (ref 42.7–76)
PLATELET # BLD AUTO: 238 10*3/MM3 (ref 140–450)
PMV BLD AUTO: 9 FL (ref 6–12)
POTASSIUM SERPL-SCNC: 4.6 MMOL/L (ref 3.4–5)
PROT SERPL-MCNC: 7.6 G/DL (ref 6.3–8.6)
RBC # BLD AUTO: 5.18 10*6/MM3 (ref 4.14–5.8)
SODIUM SERPL-SCNC: 139 MMOL/L (ref 137–145)
TRIGL SERPL-MCNC: 167 MG/DL
VLDLC SERPL-MCNC: 29 MG/DL (ref 5–40)
WBC NRBC COR # BLD: 6.43 10*3/MM3 (ref 3.4–10.8)

## 2022-12-13 PROCEDURE — 80061 LIPID PANEL: CPT | Performed by: FAMILY MEDICINE

## 2022-12-13 PROCEDURE — 80053 COMPREHEN METABOLIC PANEL: CPT | Performed by: FAMILY MEDICINE

## 2022-12-13 PROCEDURE — 36415 COLL VENOUS BLD VENIPUNCTURE: CPT | Performed by: FAMILY MEDICINE

## 2022-12-13 PROCEDURE — 85025 COMPLETE CBC W/AUTO DIFF WBC: CPT | Performed by: FAMILY MEDICINE

## 2022-12-13 PROCEDURE — 84439 ASSAY OF FREE THYROXINE: CPT | Performed by: FAMILY MEDICINE

## 2022-12-13 PROCEDURE — 84443 ASSAY THYROID STIM HORMONE: CPT | Performed by: FAMILY MEDICINE

## 2022-12-13 PROCEDURE — 99214 OFFICE O/P EST MOD 30 MIN: CPT | Performed by: FAMILY MEDICINE

## 2022-12-13 RX ORDER — LISINOPRIL 10 MG/1
10 TABLET ORAL DAILY
Qty: 30 TABLET | Refills: 5 | Status: SHIPPED | OUTPATIENT
Start: 2022-12-13

## 2022-12-13 RX ORDER — DOXYCYCLINE 100 MG/1
CAPSULE ORAL
COMMUNITY
Start: 2022-12-08 | End: 2022-12-29

## 2022-12-13 NOTE — PROGRESS NOTES
Subjective   Zurdo Rothman is a 56 y.o. male.   Patient with hyperlipidemia here today accompanied by his wife with some elevated blood pressures for the past month.  Systolics have been as high as 180, diastolics frequently over 100 and up to 106.  He went to an urgent care with some sinus issues and blood pressure was 179/100.  He has really not been symptomatic with this and that he has no chest pain or palpitations.  Occasionally gets a mild headache.  He has not been on blood pressure medication before.  There is a family history in her brother.  History of Present Illness    The following portions of the patient's history were reviewed and updated as appropriate: allergies, current medications, past family history, past medical history, past social history, past surgical history and problem list.    Review of Systems   Constitutional: Negative for activity change, appetite change, diaphoresis, fatigue, fever and unexpected weight change.   HENT: Negative for congestion, ear pain, hearing loss, sinus pressure, sore throat, tinnitus, trouble swallowing and voice change.    Eyes: Negative.    Respiratory: Negative.    Cardiovascular: Negative.    Gastrointestinal: Negative for abdominal distention, abdominal pain, blood in stool, constipation, diarrhea, nausea and vomiting.   Endocrine: Negative.    Genitourinary: Negative for decreased urine volume, dysuria, frequency, hematuria and urgency.   Musculoskeletal: Negative.    Skin: Negative.    Allergic/Immunologic: Negative.    Neurological: Negative for dizziness, tremors, seizures, syncope, speech difficulty and weakness.   Hematological: Negative.    Psychiatric/Behavioral: Negative for dysphoric mood and sleep disturbance. The patient is not nervous/anxious.    All other systems reviewed and are negative.      Objective    Body mass index is 33.4 kg/m².  Vitals:    12/13/22 0830   BP: 162/99   BP Location: Left arm   Patient Position: Sitting   Cuff  "Size: Adult   Pulse: 70   Resp: 16   Temp: 98.6 °F (37 °C)   SpO2: 99%   Weight: 106 kg (232 lb 12.8 oz)   Height: 177.8 cm (70\")       Physical Exam  Vitals and nursing note reviewed.   Constitutional:       General: He is not in acute distress.     Appearance: He is well-developed.   HENT:      Head: Normocephalic and atraumatic.      Nose: Nose normal.   Eyes:      Conjunctiva/sclera: Conjunctivae normal.      Pupils: Pupils are equal, round, and reactive to light.   Neck:      Thyroid: No thyromegaly.      Vascular: No JVD.      Trachea: No tracheal deviation.   Cardiovascular:      Rate and Rhythm: Normal rate and regular rhythm.      Heart sounds: Normal heart sounds. No murmur heard.  Pulmonary:      Effort: Pulmonary effort is normal.      Breath sounds: Normal breath sounds. No wheezing.   Chest:      Chest wall: No tenderness.   Abdominal:      General: Bowel sounds are normal. There is no distension.      Palpations: Abdomen is soft. There is no mass.      Tenderness: There is no abdominal tenderness.   Musculoskeletal:         General: No tenderness. Normal range of motion.      Cervical back: Normal range of motion and neck supple.      Comments:          Lymphadenopathy:      Cervical: No cervical adenopathy.   Skin:     General: Skin is warm and dry.      Coloration: Skin is not pale.      Findings: No erythema or rash.   Neurological:      Mental Status: He is alert and oriented to person, place, and time.      Motor: No abnormal muscle tone.      Coordination: Coordination normal.         Assessment & Plan   Diagnoses and all orders for this visit:    1. Essential hypertension (Primary)  -     Cancel: Comprehensive Metabolic Panel  -     CBC & Differential; Future  -     lisinopril (PRINIVIL,ZESTRIL) 10 MG tablet; Take 1 tablet by mouth Daily.  Dispense: 30 tablet; Refill: 5  -     Adult Transthoracic Echo Complete W/ Cont if Necessary Per Protocol; Future    2. Mixed hyperlipidemia  -     Lipid " Panel; Future  -     T4, Free  -     TSH    Given his elevated blood pressures we will go ahead and start lisinopril while pursuing work-up including labs, echocardiogram as above and follow-up accordingly.    Will also recheck lipids.  He will return to see me in a week to follow-up on the blood pressure.  He is advised to monitor daily and as needed with a goal of 130/80 or less.  Patient is advised to seek immediate medical attention for any acute neurologic changes or stroke-like symptoms.             This document has been electronically signed by Maria G Ramires MD on December 13, 2022 08:42 CST

## 2022-12-14 LAB
T4 FREE SERPL-MCNC: 1.09 NG/DL (ref 0.93–1.7)
TSH SERPL DL<=0.05 MIU/L-ACNC: 2.87 UIU/ML (ref 0.27–4.2)

## 2022-12-15 ENCOUNTER — HOSPITAL ENCOUNTER (OUTPATIENT)
Dept: MRI IMAGING | Facility: HOSPITAL | Age: 57
Discharge: HOME | End: 2022-12-15
Admitting: ORTHOPAEDIC SURGERY

## 2022-12-15 DIAGNOSIS — M25.562 ACUTE PAIN OF LEFT KNEE: ICD-10-CM

## 2022-12-15 DIAGNOSIS — M23.92 INTERNAL DERANGEMENT OF LEFT KNEE: ICD-10-CM

## 2022-12-15 PROCEDURE — 73721 MRI JNT OF LWR EXTRE W/O DYE: CPT

## 2022-12-20 ENCOUNTER — OFFICE VISIT (OUTPATIENT)
Dept: FAMILY MEDICINE CLINIC | Facility: CLINIC | Age: 57
End: 2022-12-20

## 2022-12-20 VITALS
TEMPERATURE: 99.6 F | DIASTOLIC BLOOD PRESSURE: 80 MMHG | SYSTOLIC BLOOD PRESSURE: 148 MMHG | HEIGHT: 70 IN | RESPIRATION RATE: 16 BRPM | HEART RATE: 82 BPM | OXYGEN SATURATION: 99 % | BODY MASS INDEX: 33.21 KG/M2 | WEIGHT: 232 LBS

## 2022-12-20 DIAGNOSIS — R73.9 HYPERGLYCEMIA: ICD-10-CM

## 2022-12-20 DIAGNOSIS — I10 ESSENTIAL HYPERTENSION: Primary | ICD-10-CM

## 2022-12-20 DIAGNOSIS — E78.2 MIXED HYPERLIPIDEMIA: ICD-10-CM

## 2022-12-20 PROCEDURE — 99214 OFFICE O/P EST MOD 30 MIN: CPT | Performed by: FAMILY MEDICINE

## 2022-12-20 NOTE — PROGRESS NOTES
"Subjective   Zurdo Rothman is a 56 y.o. male.   Patient here today for 1 week follow-up on hypertension.  Blood pressures are doing better although not still quite to goal, but markedly improved.  He is feeling better overall.  He has tolerated the lisinopril with  Had labs to review including metabolic panel which was normal, with blood sugar very mildly elevated at 109.  Lipids also improved overall with total cholesterol 176.  History of Present Illness    The following portions of the patient's history were reviewed and updated as appropriate: allergies, current medications, past family history, past medical history, past social history, past surgical history and problem list.    Review of Systems   Constitutional: Negative for activity change, appetite change, diaphoresis, fatigue, fever and unexpected weight change.   HENT: Negative for congestion, ear pain, hearing loss, sinus pressure, sore throat, tinnitus, trouble swallowing and voice change.    Eyes: Negative.    Respiratory: Negative.    Cardiovascular: Negative.    Gastrointestinal: Negative for abdominal distention, abdominal pain, blood in stool, constipation, diarrhea, nausea and vomiting.   Endocrine: Negative.    Genitourinary: Negative for decreased urine volume, dysuria, frequency, hematuria and urgency.   Musculoskeletal: Negative.    Skin: Negative.    Allergic/Immunologic: Negative.    Neurological: Negative for dizziness, tremors, seizures, syncope, speech difficulty and weakness.   Hematological: Negative.    Psychiatric/Behavioral: Negative for dysphoric mood and sleep disturbance. The patient is not nervous/anxious.    All other systems reviewed and are negative.      Objective    Body mass index is 33.29 kg/m².  Vitals:    12/20/22 0759   BP: 148/80   BP Location: Left arm   Patient Position: Sitting   Cuff Size: Adult   Pulse: 82   Resp: 16   Temp: 99.6 °F (37.6 °C)   SpO2: 99%   Weight: 105 kg (232 lb)   Height: 177.8 cm (70\") "       Physical Exam  Vitals and nursing note reviewed.   Constitutional:       General: He is not in acute distress.     Appearance: He is well-developed.   HENT:      Head: Normocephalic and atraumatic.      Nose: Nose normal.   Eyes:      Conjunctiva/sclera: Conjunctivae normal.      Pupils: Pupils are equal, round, and reactive to light.   Neck:      Thyroid: No thyromegaly.      Vascular: No JVD.      Trachea: No tracheal deviation.   Cardiovascular:      Rate and Rhythm: Normal rate and regular rhythm.      Heart sounds: Normal heart sounds. No murmur heard.  Pulmonary:      Effort: Pulmonary effort is normal.      Breath sounds: Normal breath sounds. No wheezing.   Chest:      Chest wall: No tenderness.   Abdominal:      General: Bowel sounds are normal. There is no distension.      Palpations: Abdomen is soft. There is no mass.      Tenderness: There is no abdominal tenderness.   Musculoskeletal:         General: No tenderness. Normal range of motion.      Cervical back: Normal range of motion and neck supple.      Comments:          Lymphadenopathy:      Cervical: No cervical adenopathy.   Skin:     General: Skin is warm and dry.      Coloration: Skin is not pale.      Findings: No erythema or rash.   Neurological:      Mental Status: He is alert and oriented to person, place, and time.      Motor: No abnormal muscle tone.      Coordination: Coordination normal.       Lab on 12/13/2022   Component Date Value Ref Range Status   • Glucose 12/13/2022 109 (H)  70 - 99 mg/dL Final   • BUN 12/13/2022 13  7 - 23 mg/dL Final   • Creatinine 12/13/2022 0.97  0.70 - 1.30 mg/dL Final   • Sodium 12/13/2022 139  137 - 145 mmol/L Final   • Potassium 12/13/2022 4.6  3.4 - 5.0 mmol/L Final   • Chloride 12/13/2022 105  101 - 112 mmol/L Final   • CO2 12/13/2022 25.0  22.0 - 30.0 mmol/L Final   • Calcium 12/13/2022 9.2  8.4 - 10.2 mg/dL Final   • Total Protein 12/13/2022 7.6  6.3 - 8.6 g/dL Final   • Albumin 12/13/2022 4.40   3.50 - 5.00 g/dL Final   • ALT (SGPT) 12/13/2022 41  <=50 U/L Final   • AST (SGOT) 12/13/2022 30  17 - 59 U/L Final   • Alkaline Phosphatase 12/13/2022 69  38 - 126 U/L Final   • Total Bilirubin 12/13/2022 0.7  0.2 - 1.3 mg/dL Final   • Globulin 12/13/2022 3.2  2.3 - 3.5 gm/dL Final   • A/G Ratio 12/13/2022 1.4  1.1 - 1.8 g/dL Final   • BUN/Creatinine Ratio 12/13/2022 13.4  7.0 - 25.0 Final   • Anion Gap 12/13/2022 9.0  5.0 - 15.0 mmol/L Final   • eGFR 12/13/2022 91.1  >60.0 mL/min/1.73 Final    National Kidney Foundation and American Society of Nephrology (ASN) Task Force recommended calculation based on the Chronic Kidney Disease Epidemiology Collaboration (CKD-EPI) equation refit without adjustment for race.   • Total Cholesterol 12/13/2022 176  150 - 200 mg/dL Final   • Triglycerides 12/13/2022 167 (H)  <=150 mg/dL Final   • HDL Cholesterol 12/13/2022 50  40 - 59 mg/dL Final   • LDL Cholesterol  12/13/2022 97  <=100 mg/dL Final   • VLDL Cholesterol 12/13/2022 29  5 - 40 mg/dL Final   • LDL/HDL Ratio 12/13/2022 1.85  0.00 - 3.55 Final   • WBC 12/13/2022 6.43  3.40 - 10.80 10*3/mm3 Final   • RBC 12/13/2022 5.18  4.14 - 5.80 10*6/mm3 Final   • Hemoglobin 12/13/2022 14.9  13.0 - 17.7 g/dL Final   • Hematocrit 12/13/2022 45.3  37.5 - 51.0 % Final   • MCV 12/13/2022 87.5  79.0 - 97.0 fL Final   • MCH 12/13/2022 28.8  26.6 - 33.0 pg Final   • MCHC 12/13/2022 32.9  31.5 - 35.7 g/dL Final   • RDW 12/13/2022 13.1  12.3 - 15.4 % Final   • RDW-SD 12/13/2022 41.5  37.0 - 54.0 fl Final   • MPV 12/13/2022 9.0  6.0 - 12.0 fL Final   • Platelets 12/13/2022 238  140 - 450 10*3/mm3 Final   • Neutrophil % 12/13/2022 45.4  42.7 - 76.0 % Final   • Lymphocyte % 12/13/2022 36.5  19.6 - 45.3 % Final   • Monocyte % 12/13/2022 12.8 (H)  5.0 - 12.0 % Final   • Eosinophil % 12/13/2022 4.7  0.3 - 6.2 % Final   • Basophil % 12/13/2022 0.6  0.0 - 1.5 % Final   • Neutrophils, Absolute 12/13/2022 2.92  1.70 - 7.00 10*3/mm3 Final   • Lymphocytes,  Absolute 12/13/2022 2.35  0.70 - 3.10 10*3/mm3 Final   • Monocytes, Absolute 12/13/2022 0.82  0.10 - 0.90 10*3/mm3 Final   • Eosinophils, Absolute 12/13/2022 0.30  0.00 - 0.40 10*3/mm3 Final   • Basophils, Absolute 12/13/2022 0.04  0.00 - 0.20 10*3/mm3 Final   Office Visit on 12/13/2022   Component Date Value Ref Range Status   • Free T4 12/13/2022 1.09  0.93 - 1.70 ng/dL Final   • TSH 12/13/2022 2.870  0.270 - 4.200 uIU/mL Final   ]    Assessment & Plan   Diagnoses and all orders for this visit:    1. Essential hypertension (Primary)    2. Mixed hyperlipidemia    3. Hyperglycemia    Continue current medications for hypertension and continue to monitor blood pressures regularly with goal of 130/80 or less  Patient is advised to seek immediate medical attention for any acute neurologic changes or stroke-like symptoms.     Continue rosuvastatin for hyperlipidemia.    Reviewed labs with patient today, as above    Discussed diet, lifestyle changes to help with hyperglycemia and prevention of progression to diabetes.  We will reassess in 3 months regarding this        This document has been electronically signed by Maria G Ramires MD on December 20, 2022 08:15 CST

## 2022-12-29 ENCOUNTER — OFFICE VISIT (OUTPATIENT)
Dept: ORTHOPEDIC SURGERY | Facility: CLINIC | Age: 57
End: 2022-12-29
Payer: COMMERCIAL

## 2022-12-29 VITALS — HEIGHT: 70 IN | BODY MASS INDEX: 33.21 KG/M2 | WEIGHT: 232 LBS

## 2022-12-29 DIAGNOSIS — M23.92 INTERNAL DERANGEMENT OF LEFT KNEE: Primary | ICD-10-CM

## 2022-12-29 DIAGNOSIS — M94.262 CHONDROMALACIA, LEFT KNEE: ICD-10-CM

## 2022-12-29 DIAGNOSIS — G47.33 OBSTRUCTIVE SLEEP APNEA, ADULT: ICD-10-CM

## 2022-12-29 PROCEDURE — 1159F MED LIST DOCD IN RCRD: CPT | Performed by: ORTHOPAEDIC SURGERY

## 2022-12-29 PROCEDURE — 99213 OFFICE O/P EST LOW 20 MIN: CPT | Performed by: ORTHOPAEDIC SURGERY

## 2022-12-29 PROCEDURE — 20610 DRAIN/INJ JOINT/BURSA W/O US: CPT | Performed by: ORTHOPAEDIC SURGERY

## 2022-12-29 PROCEDURE — 1160F RVW MEDS BY RX/DR IN RCRD: CPT | Performed by: ORTHOPAEDIC SURGERY

## 2022-12-29 RX ADMIN — LIDOCAINE HYDROCHLORIDE 2 ML: 10 INJECTION, SOLUTION EPIDURAL; INFILTRATION; INTRACAUDAL; PERINEURAL at 09:31

## 2022-12-29 RX ADMIN — TRIAMCINOLONE ACETONIDE 40 MG: 40 INJECTION, SUSPENSION INTRA-ARTICULAR; INTRAMUSCULAR at 09:31

## 2022-12-29 NOTE — PROGRESS NOTES
Zurdo Rothman is a 57 y.o. male returns for     Chief Complaint   Patient presents with   • Left Knee - Follow-up       HISTORY OF PRESENT ILLNESS:  Patient continues having pain in his left knee.  He continues to have sharp stabbing pains and has pain with pivoting and twisting as well as deep knee bends.  Physical therapy was not beneficial.  He has pain with activities of daily living as well as with increased activity.     CONCURRENT MEDICAL HISTORY:    The following portions of the patient's history were reviewed and updated as appropriate: allergies, current medications, past family history, past medical history, past social history, past surgical history and problem list.     ROS  No fevers or chills.  No chest pain or shortness of air.  No GI or  disturbances.    PHYSICAL EXAMINATION:       Ht 177.8 cm (70\")   Wt 105 kg (232 lb)   BMI 33.29 kg/m²     Physical Exam  Constitutional:       General: He is not in acute distress.     Appearance: Normal appearance.   Pulmonary:      Effort: Pulmonary effort is normal. No respiratory distress.   Neurological:      Mental Status: He is alert and oriented to person, place, and time.         GAIT:     []  Normal  [x]  Antalgic    Assistive device: [x]  None  []  Walker     []  Crutches  []  Cane     []  Wheelchair  []  Stretcher    Left Knee Exam     Comments:  He has good range of motion.  He has good stability on exam.  He has sharp medial joint line tenderness.  Positive Snow test.  Good distal pulses and sensation.  Good muscle tone and strength.              Adult Transthoracic Echo Complete W/ Cont if Necessary Per Protocol    Addendum Date: 12/22/2022 Addendum:   •  Left ventricular ejection fraction appears to be 51 - 55%. •  Left ventricular wall thickness is consistent with borderline concentric hypertrophy. •  Left ventricular diastolic function is consistent with (grade I) impaired relaxation. •  Estimated right ventricular systolic pressure  from tricuspid regurgitation is normal (<35 mmHg).     Result Date: 12/22/2022  Narrative: •  Left ventricular ejection fraction appears to be 51 - 55%. •  Left ventricular wall thickness is consistent with borderline concentric hypertrophy. •  Left ventricular diastolic function is consistent with (grade I) impaired relaxation. •  Estimated right ventricular systolic pressure from tricuspid regurgitation is normal (<35 mmHg).     MRI Knee Left Without Contrast    Result Date: 12/16/2022  Narrative: EXAM: MRI KNEE LEFT  WO CONTRAST CLINICAL INDICATION: Left knee pain COMPARISON: 8/2/2022 TECHNIQUE: The MRI was done using axial T2 fat sat, coronal PD and T2 fat sat and sagittal T1, PD, and T2 fat sat images. FINDINGS: There is a moderate knee joint effusion. The ACL and PCL are intact. There is periligamentous edema surrounding the MCL consistent with grade 1 sprain. Both menisci are intact without tear. There is moderate to severe chondromalacia in the medial compartment with some associated subchondral signal changes. Articular cartilage in the patellofemoral and lateral compartments is relatively well preserved. There is some ill-defined bone edema in the anterior central tibial plateau. No discrete fracture line is seen. The visualized osseous structures otherwise have normal morphology and signal characteristics with no evidence of bone marrow edema, occult fractures, or marrow-replacing bone lesions.     Impression: 1. Moderate to severe chondromalacia in the medial compartment. 2. Grade 1 MCL sprain. 3. Ill-defined bone edema, possibly bone contusion in the anterior central tibial plateau. 4. Moderate knee joint effusion.  Electronically signed by:  Sudhakar Jackson MD  12/16/2022 5:44 PM [facility] Workstation: [id number]            ASSESSMENT:    Diagnoses and all orders for this visit:    Internal derangement of left knee  -     Large Joint Arthrocentesis: L knee    Obstructive sleep apnea, adult  -     Large Joint  Arthrocentesis: L knee    Chondromalacia, left knee  -     Large Joint Arthrocentesis: L knee          PLAN    The MRI and results were reviewed with the patient.  He has chondromalacia in the left knee and also has bone bruising affecting the medial femoral condyle and medial tibial plateau.  We discussed slowly progressing activity as tolerated.  We also discussed continued weightbearing support to allow for slow progressive healing.  He did not feel like meloxicam was helpful so he is going to stop meloxicam and resume taking Aleve.  We discussed proceeding with a steroid injection into the left knee.    Large Joint Arthrocentesis: L knee  Date/Time: 12/29/2022 9:31 AM  Consent given by: patient  Site marked: site marked  Timeout: Immediately prior to procedure a time out was called to verify the correct patient, procedure, equipment, support staff and site/side marked as required   Supporting Documentation  Indications: pain   Procedure Details  Location: knee - L knee  Preparation: Patient was prepped and draped in the usual sterile fashion  Needle size: 22 G  Approach: anteromedial  Medications administered: 40 mg triamcinolone acetonide 40 MG/ML; 2 mL lidocaine PF 1% 1 %  Patient tolerance: patient tolerated the procedure well with no immediate complications          Return in about 6 weeks (around 2/9/2023) for recheck.    Jose G Quiros MD

## 2022-12-30 ENCOUNTER — TELEPHONE (OUTPATIENT)
Dept: FAMILY MEDICINE CLINIC | Facility: CLINIC | Age: 57
End: 2022-12-30

## 2022-12-30 NOTE — TELEPHONE ENCOUNTER
Deyanira Stokes MA   12/30/2022 10:37 AM CST   Contacted the patient and informed him of the message. He stated understanding and thanked me. Advised to contact office with any questions or concerns.     Maria G Ramires MD   12/30/2022 10:10 AM CST   Notify patient echo shows overall good heart function, a bit of thickening of the main pumping chamber consistent with high blood pressure

## 2023-01-01 RX ORDER — LIDOCAINE HYDROCHLORIDE 10 MG/ML
2 INJECTION, SOLUTION EPIDURAL; INFILTRATION; INTRACAUDAL; PERINEURAL
Status: COMPLETED | OUTPATIENT
Start: 2022-12-29 | End: 2022-12-29

## 2023-01-01 RX ORDER — TRIAMCINOLONE ACETONIDE 40 MG/ML
40 INJECTION, SUSPENSION INTRA-ARTICULAR; INTRAMUSCULAR
Status: COMPLETED | OUTPATIENT
Start: 2022-12-29 | End: 2022-12-29

## 2023-01-08 RX ORDER — ROSUVASTATIN CALCIUM 5 MG/1
5 TABLET, COATED ORAL DAILY
Qty: 30 TABLET | Refills: 5 | Status: SHIPPED | OUTPATIENT
Start: 2023-01-08

## 2023-01-13 ENCOUNTER — CLINICAL SUPPORT (OUTPATIENT)
Dept: ORTHOPEDIC SURGERY | Facility: CLINIC | Age: 58
End: 2023-01-13
Payer: COMMERCIAL

## 2023-01-13 VITALS — BODY MASS INDEX: 33.21 KG/M2 | WEIGHT: 232 LBS | HEIGHT: 70 IN

## 2023-01-13 DIAGNOSIS — M17.12 PRIMARY OSTEOARTHRITIS OF LEFT KNEE: ICD-10-CM

## 2023-01-13 DIAGNOSIS — M23.92 INTERNAL DERANGEMENT OF LEFT KNEE: Primary | ICD-10-CM

## 2023-01-13 DIAGNOSIS — M25.562 ACUTE PAIN OF LEFT KNEE: ICD-10-CM

## 2023-01-13 DIAGNOSIS — M94.262 CHONDROMALACIA, LEFT KNEE: ICD-10-CM

## 2023-01-13 PROCEDURE — 20610 DRAIN/INJ JOINT/BURSA W/O US: CPT | Performed by: NURSE PRACTITIONER

## 2023-01-13 RX ORDER — NAPROXEN SODIUM 220 MG
220 TABLET ORAL 2 TIMES DAILY PRN
COMMUNITY

## 2023-01-13 NOTE — PROGRESS NOTES
"Knee Joint Injection      Patient: Zurdo Rothman    YOB: 1965    Chief Complaint   Patient presents with   • Left Knee - Injections       History of Present Illness: This 57-year-old male patient presents today for a left knee intra-articular Durolane Visco gel injection.  This patient has no new complaints.  Denies numbness and tingling.  Denies fever and chills.  Denies injury since last visit.      Physical Exam: 57 y.o. male  General Appearance:    Alert, cooperative, in no acute distress                   Vitals:    01/13/23 0848   Weight: 105 kg (232 lb)   Height: 177.8 cm (70\")   PainSc:   2        Patient is alert and oriented, ×3 no acute distress.  Affect is normal respiratory rate is normal unlabored.  Exam and complaints are unchanged.    Procedure:  Large Joint Arthrocentesis: L knee  Date/Time: 1/13/2023 8:49 AM  Consent given by: patient  Site marked: site marked  Timeout: Immediately prior to procedure a time out was called to verify the correct patient, procedure, equipment, support staff and site/side marked as required   Supporting Documentation  Indications: pain   Procedure Details  Location: knee - L knee  Preparation: Patient was prepped and draped in the usual sterile fashion  Needle size: 20 G  Approach: anteromedial  Medications administered: 60 mg Sodium Hyaluronate 60 MG/3ML  Patient tolerance: patient tolerated the procedure well with no immediate complications          Assessment:    Diagnoses and all orders for this visit:    Internal derangement of left knee  -     Large Joint Arthrocentesis: L knee  -     naproxen sodium (ALEVE) 220 MG tablet; Take 220 mg by mouth 2 (Two) Times a Day As Needed.    Chondromalacia, left knee  -     Large Joint Arthrocentesis: L knee  -     naproxen sodium (ALEVE) 220 MG tablet; Take 220 mg by mouth 2 (Two) Times a Day As Needed.    Acute pain of left knee  -     Large Joint Arthrocentesis: L knee  -     naproxen sodium (ALEVE) " 220 MG tablet; Take 220 mg by mouth 2 (Two) Times a Day As Needed.    Primary osteoarthritis of left knee        Plan:   Slowly increase activity as tolerated over the next 24 to 48 hours.  Encouraged RICE therapy as needed.  Discussed importance of leg strengthening and general conditioning.  Discussed warning signs of injection.  Discussed that purpose of injections was symptom improvement and improved activity.  Also discussed that further treatment options depended on symptoms at followup and length of time of improvement after injections.  Recommended the patient follow-up in approximately 6 weeks for reevaluation.  May follow-up sooner as needed if symptoms change or worsen or for new complaint.    All questions and concerns are addressed with understanding noted. They are aware and are in agreement to this plan.    Return in about 6 weeks (around 2/24/2023) for Recheck.    BROOKLYN Garza      This document has been electronically signed by BROOKLYN Garza on January 13, 2023 10:00 CST      EMR Dragon/Transciption Disclaimer: Some of this note may be an electronic transcription/translation of spoken language to printed text using the Dragon Dictation System.

## 2023-03-07 ENCOUNTER — OFFICE VISIT (OUTPATIENT)
Dept: ORTHOPEDIC SURGERY | Facility: CLINIC | Age: 58
End: 2023-03-07
Payer: COMMERCIAL

## 2023-03-07 VITALS — WEIGHT: 231 LBS | BODY MASS INDEX: 33.07 KG/M2 | HEIGHT: 70 IN

## 2023-03-07 DIAGNOSIS — M23.92 INTERNAL DERANGEMENT OF LEFT KNEE: ICD-10-CM

## 2023-03-07 DIAGNOSIS — M17.12 PRIMARY OSTEOARTHRITIS OF LEFT KNEE: ICD-10-CM

## 2023-03-07 DIAGNOSIS — G89.29 CHRONIC PAIN OF LEFT KNEE: Primary | ICD-10-CM

## 2023-03-07 DIAGNOSIS — M94.262 CHONDROMALACIA, LEFT KNEE: ICD-10-CM

## 2023-03-07 DIAGNOSIS — M25.562 CHRONIC PAIN OF LEFT KNEE: Primary | ICD-10-CM

## 2023-03-07 PROCEDURE — 99212 OFFICE O/P EST SF 10 MIN: CPT | Performed by: NURSE PRACTITIONER

## 2023-03-07 NOTE — PROGRESS NOTES
"Zurdo Rothman is a 57 y.o. male returns for     Chief Complaint   Patient presents with   • Left Knee - Follow-up       HISTORY OF PRESENT ILLNESS: This 57-year-old male patient presents today for 6-week follow-up status Durolane Visco gel injection to the left knee.  The patient had a MRI of the left knee on 12/15/2023. Diagnosed with moderate to severe chondromalacia in the medial compartment, MCL sprain, bone edema, possible contusion and joint effusion.   He received a CCS injection on 12/29/2022, then a gel injection on 01/13/2023.   At today's visit, the patient has no complaints of pain.  The patient states overall, his pain has improved.  Denies numbness and tingling.  Denies fever or chills.         CONCURRENT MEDICAL HISTORY:    The following portions of the patient's history were reviewed and updated as appropriate: allergies, current medications, past family history, past medical history, past social history, past surgical history and problem list.     ROS  No fevers or chills.  No chest pain or shortness of air.  No GI or  disturbances.    PHYSICAL EXAMINATION:       Ht 177.8 cm (70\")   Wt 105 kg (231 lb)   BMI 33.15 kg/m²     Physical Exam    GAIT:     [x]  Normal  []  Antalgic    Assistive device: [x]  None  []  Walker     []  Crutches  []  Cane     []  Wheelchair  []  Stretcher    Left Knee Exam     Tenderness   The patient is experiencing no tenderness.     Comments:  Good range of motion.  No pain with arc of motion.  No effusion.  No swelling.                No results found.          ASSESSMENT:    Diagnoses and all orders for this visit:    Chronic pain of left knee    Internal derangement of left knee    Chondromalacia, left knee    Primary osteoarthritis of left knee          PLAN  Recommended the patient continue generalized strengthening and range of motion exercises.  Recommended to modify activity based on pain.  Encouraged to follow-up as needed if symptoms change or " worsen.  Discussed with patient he may follow-up in 4 weeks for repeat corticosteroid injection as needed.  May follow-up sooner as needed if symptoms change or worsen or for new complaint.    All questions and concerns are addressed with understanding noted. They are aware and are in agreement to this plan.    Return if symptoms worsen or fail to improve.    BROOKLYN Garza     This document has been electronically signed by BROOKLYN Garza on March 7, 2023 16:34 CST      EMR Dragon/Transciption Disclaimer: Some of this note may be an electronic transcription/translation of spoken language to printed text using the Dragon Dictation System.

## 2023-04-05 ENCOUNTER — OFFICE VISIT (OUTPATIENT)
Dept: ORTHOPEDIC SURGERY | Facility: CLINIC | Age: 58
End: 2023-04-05
Payer: COMMERCIAL

## 2023-04-05 VITALS — HEIGHT: 70 IN | WEIGHT: 231 LBS | BODY MASS INDEX: 33.07 KG/M2

## 2023-04-05 DIAGNOSIS — M23.92 INTERNAL DERANGEMENT OF LEFT KNEE: ICD-10-CM

## 2023-04-05 DIAGNOSIS — M94.262 CHONDROMALACIA, LEFT KNEE: ICD-10-CM

## 2023-04-05 DIAGNOSIS — M17.12 PRIMARY OSTEOARTHRITIS OF LEFT KNEE: ICD-10-CM

## 2023-04-05 DIAGNOSIS — G89.29 CHRONIC PAIN OF LEFT KNEE: Primary | ICD-10-CM

## 2023-04-05 DIAGNOSIS — M25.562 CHRONIC PAIN OF LEFT KNEE: Primary | ICD-10-CM

## 2023-04-05 PROCEDURE — 20610 DRAIN/INJ JOINT/BURSA W/O US: CPT | Performed by: NURSE PRACTITIONER

## 2023-04-05 RX ORDER — TRIAMCINOLONE ACETONIDE 40 MG/ML
40 INJECTION, SUSPENSION INTRA-ARTICULAR; INTRAMUSCULAR
Status: COMPLETED | OUTPATIENT
Start: 2023-04-05 | End: 2023-04-05

## 2023-04-05 RX ORDER — LIDOCAINE HYDROCHLORIDE 10 MG/ML
2 INJECTION, SOLUTION INFILTRATION; PERINEURAL
Status: COMPLETED | OUTPATIENT
Start: 2023-04-05 | End: 2023-04-05

## 2023-04-05 RX ADMIN — TRIAMCINOLONE ACETONIDE 40 MG: 40 INJECTION, SUSPENSION INTRA-ARTICULAR; INTRAMUSCULAR at 15:20

## 2023-04-05 RX ADMIN — LIDOCAINE HYDROCHLORIDE 2 ML: 10 INJECTION, SOLUTION INFILTRATION; PERINEURAL at 15:20

## 2023-04-05 NOTE — PROGRESS NOTES
"Zurdo Rothman is a 57 y.o. male returns for     Chief Complaint   Patient presents with   • Left Knee - Follow-up       HISTORY OF PRESENT ILLNESS: This 57-year-old male patient presents today for a repeat intra-articular corticosteroid injection to the left knee.  This patient has a history of internal derangement of the left knee, chondromalacia and chronic left knee pain.  The patient has received corticosteroid injections in the past, on 12/29/2022 and a Visco gel injection on 1/13/2023.  The patient is requesting a repeat CCS IA injection today.  Denies new injury.  Denies fever and chills.  Denies numbness and tingling.  The patient states he is not ready for surgical intervention and will continue with the CCS IA injections at this point.       CONCURRENT MEDICAL HISTORY:    The following portions of the patient's history were reviewed and updated as appropriate: allergies, current medications, past family history, past medical history, past social history, past surgical history and problem list.     ROS  No fevers or chills.  No chest pain or shortness of air.  No GI or  disturbances.    PHYSICAL EXAMINATION:       Ht 177.8 cm (70\")   Wt 105 kg (231 lb)   BMI 33.15 kg/m²     Physical Exam    GAIT:     []  Normal  [x]  Antalgic    Assistive device: [x]  None  []  Walker     []  Crutches  []  Cane     []  Wheelchair  []  Stretcher    Ortho Exam      No results found.          ASSESSMENT:    Diagnoses and all orders for this visit:    Chronic pain of left knee  -     Large Joint Arthrocentesis: L knee    Internal derangement of left knee  -     Large Joint Arthrocentesis: L knee    Chondromalacia, left knee  -     Large Joint Arthrocentesis: L knee    Primary osteoarthritis of left knee  -     Large Joint Arthrocentesis: L knee          PLAN  Large Joint Arthrocentesis: L knee  Date/Time: 4/5/2023 3:20 PM  Consent given by: patient  Site marked: site marked  Timeout: Immediately prior to procedure a " time out was called to verify the correct patient, procedure, equipment, support staff and site/side marked as required   Supporting Documentation  Indications: pain   Procedure Details  Location: knee - L knee  Preparation: Patient was prepped and draped in the usual sterile fashion  Needle size: 22 G  Approach: anteromedial  Medications administered: 40 mg triamcinolone acetonide 40 MG/ML; 2 mL lidocaine 1 %  Patient tolerance: patient tolerated the procedure well with no immediate complications        Reviewed the risk and benefits of the intra-articular corticosteroid injection.  Patient verbalized understanding.  Administered a CCS IA injection to the left knee.  Patient tolerated well with no immediate reaction.  Recommended the patient to use a compression sleeve as needed with long periods of weightbearing activity.  Encouraged RICE therapy.  Also advised if he is going to be walking for long periods, he should use a cane for support.  Recommended to follow-up as needed for change in symptoms or for new complaints.    All questions and concerns are addressed with understanding noted. They are aware and are in agreement to this plan.    Return in about 3 months (around 7/5/2023), or if symptoms worsen or fail to improve, for Recheck.    BROOKLYN Garza     This document has been electronically signed by BROOKLYN Garza on April 5, 2023 16:06 CDT      EMR Dragon/Transciption Disclaimer: Some of this note may be an electronic transcription/translation of spoken language to printed text using the Dragon Dictation System.

## 2023-05-11 DIAGNOSIS — I10 ESSENTIAL HYPERTENSION: ICD-10-CM

## 2023-05-11 RX ORDER — LISINOPRIL 10 MG/1
10 TABLET ORAL DAILY
Qty: 30 TABLET | Refills: 5 | Status: SHIPPED | OUTPATIENT
Start: 2023-05-11

## 2023-07-26 ENCOUNTER — OFFICE VISIT (OUTPATIENT)
Dept: ORTHOPEDIC SURGERY | Facility: CLINIC | Age: 58
End: 2023-07-26
Payer: COMMERCIAL

## 2023-07-26 VITALS — HEIGHT: 70 IN | WEIGHT: 219.8 LBS | BODY MASS INDEX: 31.47 KG/M2

## 2023-07-26 DIAGNOSIS — G89.29 CHRONIC PAIN OF LEFT KNEE: Primary | ICD-10-CM

## 2023-07-26 DIAGNOSIS — M23.92 INTERNAL DERANGEMENT OF LEFT KNEE: ICD-10-CM

## 2023-07-26 DIAGNOSIS — M94.262 CHONDROMALACIA, LEFT KNEE: ICD-10-CM

## 2023-07-26 DIAGNOSIS — M25.462 EFFUSION OF LEFT KNEE: ICD-10-CM

## 2023-07-26 DIAGNOSIS — M25.562 CHRONIC PAIN OF LEFT KNEE: Primary | ICD-10-CM

## 2023-07-26 DIAGNOSIS — M17.12 PRIMARY OSTEOARTHRITIS OF LEFT KNEE: ICD-10-CM

## 2023-07-26 RX ORDER — KETOROLAC TROMETHAMINE 30 MG/ML
60 INJECTION, SOLUTION INTRAMUSCULAR; INTRAVENOUS ONCE
Status: SHIPPED | OUTPATIENT
Start: 2023-07-26 | End: 2023-07-31

## 2023-07-26 RX ORDER — TRAMADOL HYDROCHLORIDE 50 MG/1
50 TABLET ORAL EVERY 12 HOURS PRN
Qty: 60 TABLET | Refills: 0 | Status: SHIPPED | OUTPATIENT
Start: 2023-07-26

## 2023-07-26 RX ORDER — TRIAMCINOLONE ACETONIDE 40 MG/ML
80 INJECTION, SUSPENSION INTRA-ARTICULAR; INTRAMUSCULAR ONCE
Status: SHIPPED | OUTPATIENT
Start: 2023-07-26

## 2023-07-26 NOTE — PROGRESS NOTES
"Zurdo Rothman is a 57 y.o. male returns for     Chief Complaint   Patient presents with    Left Knee - Follow-up       HISTORY OF PRESENT ILLNESS:Mr. Rothman is a 57-year-old male patient who was last seen on 7/14/2023, he received a corticosteroid injection to the left knee.    He wanted to continue with the CCS IA injections as needed until spring 2024 and then speak with Dr. Quiros about surgical intervention.  Unfortunately, the patient returns today and indicates the injection received on 7/14/2023 was not effective.  The patient reports the CCS IA injection only lasted a few days.  The patient's wife is present with him at today's visit.  The patient's wife reports the patient has not tried to modify his activity, rest, ice, use of brace or cane.  Patient reports \"the cane makes me look old\".  This patient initially saw Dr. Quiros in December 2022 with chronic left knee complaints.  Prior to following up with Dr. Quiros, the patient had 2 episodes of aspiration to drain the knee.  He continued to have symptoms and was referred to Ortho.  Since the patient's follow-up in December 2022, he had a MRI showing severe chondromalacia in the medial aspect of the knee, MCL sprain, bone edema and possible contusion and joint effusion.  The patient has received a corticosteroid injection on 12/29/2022, 4/5/2023 and 7/14/2023.  A gel injection on 1/13/2023.  The patient reports the previous injections did help but not the last 1.  The patient reports he also usually takes 2 Aleve daily but this is no longer helping either.  The patient has tried modified activity, physical therapy x6 weeks with [facility] PT, joint aspirations, CCS IA injections and gel injections.  The patient reports his pain is beginning to interrupt his normal ADLs.  The patient is wishing to follow-up with Dr. Quiros to discuss surgical intervention.      CONCURRENT MEDICAL HISTORY:    The following portions of the patient's history were reviewed " "and updated as appropriate: allergies, current medications, past family history, past medical history, past social history, past surgical history and problem list.     ROS  No fevers or chills.  No chest pain or shortness of air.  No GI or  disturbances.    PHYSICAL EXAMINATION:       Ht 177.8 cm (70\")   Wt 99.7 kg (219 lb 12.8 oz)   BMI 31.54 kg/m²     Physical Exam  Musculoskeletal:      Left knee: Effusion present.       GAIT:     [x]  Normal  []  Antalgic    Assistive device: [x]  None  []  Walker     []  Crutches  []  Cane     []  Wheelchair  []  Stretcher    Left Knee Exam     Other   Erythema: absent  Sensation: normal  Pulse: present  Swelling: mild  Effusion: effusion present    Comments:  Most tender at the medial joint aspect and over the medial tibia and femur.  Mild joint effusion.  Skin is warm, dry and intact.  Full extension.  Pain with weightbearing activity.              XR Knee 1 or 2 View Left    Result Date: 7/26/2023  Narrative: INDICATION: pain. COMPARISON: None relevant. FINDINGS: Left knee: Moderate narrowing of the medial joint space with small joint effusion.  No fracture or dislocation.      Narrative & Impression   EXAM: MRI KNEE LEFT  WO CONTRAST      CLINICAL INDICATION: Left knee pain     COMPARISON: 8/2/2022     TECHNIQUE: The MRI was done using axial T2 fat sat, coronal PD  and T2 fat sat and sagittal T1, PD, and T2 fat sat images.     FINDINGS: There is a moderate knee joint effusion. The ACL and  PCL are intact. There is periligamentous edema surrounding the  MCL consistent with grade 1 sprain. Both menisci are intact  without tear. There is moderate to severe chondromalacia in the  medial compartment with some associated subchondral signal  changes. Articular cartilage in the patellofemoral and lateral  compartments is relatively well preserved. There is some  ill-defined bone edema in the anterior central tibial plateau. No  discrete fracture line is seen. The visualized " osseous structures  otherwise have normal morphology and signal characteristics with  no evidence of bone marrow edema, occult fractures, or  marrow-replacing bone lesions.      IMPRESSION:  1. Moderate to severe chondromalacia in the medial compartment.  2. Grade 1 MCL sprain.  3. Ill-defined bone edema, possibly bone contusion in the  anterior central tibial plateau.  4. Moderate knee joint effusion.        Electronically signed by:  Sudhakar Jackson MD  12/16/2022 5:44 PM  [facility] Workstation: [id number]       Procedure:  Left knee         Indication:  Left knee pain   .     Technique:  3 views   .     Prior relevant exam:  None.       No evidence of acute bony, soft tissue, or joint abnormality is  noted. Bone mineralization is within normal limits. The  visualized joint spaces appear intact. Normal left knee.        IMPRESSION:  1.  Normal left knee.        Electronically signed by:  Raymond Trevino MD  8/2/2022 4:05 PM CDT  Workstation: [id number]      ASSESSMENT:    Diagnoses and all orders for this visit:    Chronic pain of left knee  -     XR Knee 1 or 2 View Left; Future  -     triamcinolone acetonide (KENALOG-40) injection 80 mg  -     ketorolac (TORADOL) injection 60 mg  -     traMADol (ULTRAM) 50 MG tablet; Take 1 tablet by mouth Every 12 (Twelve) Hours As Needed for Moderate Pain.    Internal derangement of left knee  -     triamcinolone acetonide (KENALOG-40) injection 80 mg  -     ketorolac (TORADOL) injection 60 mg  -     traMADol (ULTRAM) 50 MG tablet; Take 1 tablet by mouth Every 12 (Twelve) Hours As Needed for Moderate Pain.    Chondromalacia, left knee  -     triamcinolone acetonide (KENALOG-40) injection 80 mg  -     ketorolac (TORADOL) injection 60 mg  -     traMADol (ULTRAM) 50 MG tablet; Take 1 tablet by mouth Every 12 (Twelve) Hours As Needed for Moderate Pain.    Primary osteoarthritis of left knee  -     triamcinolone acetonide (KENALOG-40) injection 80 mg  -     ketorolac (TORADOL) injection 60 mg  -      traMADol (ULTRAM) 50 MG tablet; Take 1 tablet by mouth Every 12 (Twelve) Hours As Needed for Moderate Pain.    Effusion of left knee  -     traMADol (ULTRAM) 50 MG tablet; Take 1 tablet by mouth Every 12 (Twelve) Hours As Needed for Moderate Pain.          PLAN  This patient has chronic left knee pain that was not improved with his last CCS IA injection to the left knee.  The patient reports he has a lot to do and needs to be able to manage his pain until the end of November.  Patient states he would like to follow-up with Dr. Quiros to discuss surgical intervention.  Reminded the patient that he was previously educated he could not have a surgical intervention for 90 days status post injection.  Last injection July 14, 2023.  That means the patient would not be a surgical candidate until the second week of October.  Patient verbalizes understanding.  Recommended the patient try other conservative measures such as rest, ice, elevation, modified activity, compression sleeve and cane with weightbearing activity.  Patient verbalized understanding.  Provided patient with a compression sleeve at today's visit.  Recommended to wear for support with activity.  Patient is inquiring about other options for pain.  Suggested a IM Kenalog and IM Toradol injection.  Reviewed the risk and benefits.  Patient verbalized understanding.  Administered Kenalog 80 mg IM injection and Toradol 60 mg IM injection.  Patient tolerated well with no immediate reaction.  New prescription for tramadol 50 mg twice daily as needed sent to the pharmacy.  Reviewed risk and benefits with the patient.  Sent the patient for new x-ray, reviewed and called patient to notify of the changes, no answer.  Called and spoke with wife, update the xray shows changes from the previous xray in August 2022. Today's xray shows bone on bone findings in the medial joint space along with loss of patellofemoral joint space.   Discussed with the patient to modify his  activity and try not to overdo it.  Patient verbalizes understanding and states he will slow down soon.  Advised the patient I will notify Johnson Silva, CSA the patient is interested in discussing further treatment options with Dr. Quiros.  Advised patient if no one contacted him in the next couple of weeks to please call our office.  Patient verbalized understanding.  Advised patient he may follow-up with Ortho as needed for change in symptoms or new complaints.    All questions and concerns are addressed with understanding noted. They are aware and are in agreement to this plan.    Return for as discussed.    BROOKLYN Garza    This document has been electronically signed by BROOKLYN Garza on July 26, 2023 18:21 CDT      EMR Dragon/Transciption Disclaimer: Some of this note may be an electronic transcription/translation of spoken language to printed text using the Dragon Dictation System.     Time spent of a minimum of 30 minutes including the face to face evaluation, reviewing of medical history and prior medial records, reviewing of diagnostic studies, ordering additional tests, documentation, patient education and coordination of care.

## 2023-09-21 ENCOUNTER — OFFICE VISIT (OUTPATIENT)
Dept: ORTHOPEDIC SURGERY | Facility: CLINIC | Age: 58
End: 2023-09-21
Payer: COMMERCIAL

## 2023-09-21 VITALS — WEIGHT: 222 LBS | BODY MASS INDEX: 31.78 KG/M2 | HEIGHT: 70 IN

## 2023-09-21 DIAGNOSIS — G47.33 OBSTRUCTIVE SLEEP APNEA, ADULT: ICD-10-CM

## 2023-09-21 DIAGNOSIS — M25.462 EFFUSION OF LEFT KNEE: ICD-10-CM

## 2023-09-21 DIAGNOSIS — G89.29 CHRONIC PAIN OF LEFT KNEE: ICD-10-CM

## 2023-09-21 DIAGNOSIS — M94.262 CHONDROMALACIA, LEFT KNEE: ICD-10-CM

## 2023-09-21 DIAGNOSIS — M23.92 INTERNAL DERANGEMENT OF LEFT KNEE: ICD-10-CM

## 2023-09-21 DIAGNOSIS — M25.562 CHRONIC PAIN OF LEFT KNEE: ICD-10-CM

## 2023-09-21 DIAGNOSIS — M17.12 PRIMARY OSTEOARTHRITIS OF LEFT KNEE: Primary | ICD-10-CM

## 2023-09-21 PROCEDURE — 99214 OFFICE O/P EST MOD 30 MIN: CPT | Performed by: ORTHOPAEDIC SURGERY

## 2023-09-21 RX ORDER — PREGABALIN 75 MG/1
75 CAPSULE ORAL ONCE
OUTPATIENT
Start: 2023-09-21 | End: 2023-09-21

## 2023-09-21 RX ORDER — ACETAMINOPHEN 325 MG/1
1000 TABLET ORAL ONCE
OUTPATIENT
Start: 2023-09-21 | End: 2023-09-21

## 2023-09-21 RX ORDER — MELOXICAM 7.5 MG/1
15 TABLET ORAL ONCE
OUTPATIENT
Start: 2023-09-21 | End: 2023-09-21

## 2023-09-21 NOTE — LETTER
September 21, 2023     Patient: Zurdo Rohtman   YOB: 1965   Date of Visit: 9/21/2023       To Whom It May Concern:    Zurdo Rothman was seen in my office 9/21/23.           Sincerely,                 Jose G Quiros MD    CC:   No Recipients

## 2023-09-21 NOTE — PROGRESS NOTES
Zurdo Rothman is a 57 y.o. male returns for     Chief Complaint   Patient presents with    Left Knee - Follow-up       HISTORY OF PRESENT ILLNESS:  Follow up Left knee pain.  Patient has seen Adrienne English.  He has been given steroid injections with minimal improvement.  He has tried modified activity, PT for 6 weeks with Noffsinger, joint aspirations and Gel injections.    He mostly has dull aching pains and occasional sharp stabbing pains.  He has pain with prolonged standing and walking.  He is unhappy with his quality of life.  He is ready to discuss surgical intervention as the pain is beginning to disrupt his normal daily activities.         CONCURRENT MEDICAL HISTORY:    Past Medical History:   Diagnosis Date    Acute sinusitis     Atopic dermatitis     Breast lump     History of mammogram 06/27/2013    MAMMOGRAM UNILATERAL LT [address] ([facility]) (1) - ELEAZAR Santos    Influenza     Obesity        No Known Allergies    Current Outpatient Medications on File Prior to Visit   Medication Sig    lisinopril (PRINIVIL,ZESTRIL) 10 MG tablet TAKE 1 TABLET BY MOUTH DAILY.    naproxen sodium (ALEVE) 220 MG tablet Take 1 tablet by mouth 2 (Two) Times a Day As Needed.    rosuvastatin (CRESTOR) 5 MG tablet TAKE 1 TABLET BY MOUTH DAILY.    traMADol (ULTRAM) 50 MG tablet Take 1 tablet by mouth Every 12 (Twelve) Hours As Needed for Moderate Pain.     Current Facility-Administered Medications on File Prior to Visit   Medication    triamcinolone acetonide (KENALOG-40) injection 80 mg       Past Surgical History:   Procedure Laterality Date    COLONOSCOPY N/A 3/29/2017    Procedure: COLONOSCOPY;  Surgeon: David Monge MD;  Location: [facility] ENDOSCOPY;  Service:     HAND SURGERY Right     hand pinned back together     INJECTION OF MEDICATION  11/14/2014    Depo Medrol (Methylprednisone) 80mg (3)  - ELEAZAR Santos       Family History   Problem Relation Age of Onset    Cancer Mother     Heart disease Father        Social History  "    Socioeconomic History    Marital status:      Spouse name: Sathya    Number of children: 3   Tobacco Use    Smoking status: Never    Smokeless tobacco: Never   Substance and Sexual Activity    Alcohol use: Yes     Alcohol/week: 12.0 standard drinks     Types: 12 Cans of beer per week    Drug use: No    Sexual activity: Yes     Partners: Female           ROS  No fevers or chills.  No chest pain or shortness of air.  No GI or  disturbances.  Other than left knee pain, all other systems reviewed as negative.    PHYSICAL EXAMINATION:       Ht 177.8 cm (70\")   Wt 101 kg (222 lb)   BMI 31.85 kg/m²     Physical Exam  Vitals reviewed.   Constitutional:       General: He is not in acute distress.     Appearance: Normal appearance. He is well-developed.   Cardiovascular:      Rate and Rhythm: Normal rate and regular rhythm.      Heart sounds: Normal heart sounds.   Pulmonary:      Effort: Pulmonary effort is normal.      Breath sounds: Normal breath sounds.   Abdominal:      General: Bowel sounds are normal.      Palpations: Abdomen is soft.   Neurological:      Mental Status: He is alert and oriented to person, place, and time.   Psychiatric:         Behavior: Behavior normal.         Thought Content: Thought content normal.         Judgment: Judgment normal.       GAIT:     [x]  Normal  []  Antalgic    Assistive device: [x]  None  []  Walker     []  Crutches  []  Cane     []  Wheelchair  []  Stretcher    Left Knee Exam     Muscle Strength   The patient has normal left knee strength.    Tenderness   Left knee tenderness location: diffuse.    Range of Motion   Extension:  -5   Flexion:  110     Tests   Varus: negative Valgus: negative  Drawer:  Anterior - negative     Posterior - negative    Other   Sensation: normal  Pulse: present  Swelling: none    Comments:  Crepitation with motion  Mild to moderate pain through arc of motion              Xray Left knee 7/26/23  Narrative & Impression   INDICATION:  pain.   "   COMPARISON:  None relevant.     FINDINGS:  Left knee: Moderate narrowing of the medial joint space with small joint  effusion.  No fracture or dislocation.        ** xrays independently reviewed by me, moderate varus deformity and about 90% medial joint space collapse in the right knee.  Mild to moderated patellofemoral joint disease as well.    MRI LEFT KNEE 12/15/22  Narrative & Impression   EXAM: MRI KNEE LEFT  WO CONTRAST      CLINICAL INDICATION: Left knee pain     COMPARISON: 8/2/2022     TECHNIQUE: The MRI was done using axial T2 fat sat, coronal PD  and T2 fat sat and sagittal T1, PD, and T2 fat sat images.     FINDINGS: There is a moderate knee joint effusion. The ACL and  PCL are intact. There is periligamentous edema surrounding the  MCL consistent with grade 1 sprain. Both menisci are intact  without tear. There is moderate to severe chondromalacia in the  medial compartment with some associated subchondral signal  changes. Articular cartilage in the patellofemoral and lateral  compartments is relatively well preserved. There is some  ill-defined bone edema in the anterior central tibial plateau. No  discrete fracture line is seen. The visualized osseous structures  otherwise have normal morphology and signal characteristics with  no evidence of bone marrow edema, occult fractures, or  marrow-replacing bone lesions.      IMPRESSION:  1. Moderate to severe chondromalacia in the medial compartment.  2. Grade 1 MCL sprain.  3. Ill-defined bone edema, possibly bone contusion in the  anterior central tibial plateau.  4. Moderate knee joint effusion.        Electronically signed by:  Sudhakar Jackson MD  12/16/2022 5:44 PM  [facility] Workstation: [id number]         ASSESSMENT:    Diagnoses and all orders for this visit:    Primary osteoarthritis of left knee  -     Case Request; Standing  -     Type and screen; Future  -     Tranexamic Acid 1,000 mg in sodium chloride 0.9 % 100 mL  -     Tranexamic Acid 1,000 mg in  sodium chloride 0.9 % 100 mL  -     Bupivacaine-Meloxicam -6 MG/7ML solution 14 mL  -     ethyl alcohol 62 % 2 each  -     ceFAZolin (ANCEF) 2 g in sodium chloride 0.9 % 100 mL IVPB  -     acetaminophen (TYLENOL) tablet 975 mg  -     meloxicam (MOBIC) tablet 15 mg  -     pregabalin (LYRICA) capsule 75 mg  -     Case Request    Chronic pain of left knee  -     Case Request; Standing  -     Type and screen; Future  -     Tranexamic Acid 1,000 mg in sodium chloride 0.9 % 100 mL  -     Tranexamic Acid 1,000 mg in sodium chloride 0.9 % 100 mL  -     Bupivacaine-Meloxicam -6 MG/7ML solution 14 mL  -     ethyl alcohol 62 % 2 each  -     ceFAZolin (ANCEF) 2 g in sodium chloride 0.9 % 100 mL IVPB  -     acetaminophen (TYLENOL) tablet 975 mg  -     meloxicam (MOBIC) tablet 15 mg  -     pregabalin (LYRICA) capsule 75 mg  -     Case Request    Internal derangement of left knee  -     Case Request; Standing  -     Type and screen; Future  -     Tranexamic Acid 1,000 mg in sodium chloride 0.9 % 100 mL  -     Tranexamic Acid 1,000 mg in sodium chloride 0.9 % 100 mL  -     Bupivacaine-Meloxicam -6 MG/7ML solution 14 mL  -     ethyl alcohol 62 % 2 each  -     ceFAZolin (ANCEF) 2 g in sodium chloride 0.9 % 100 mL IVPB  -     acetaminophen (TYLENOL) tablet 975 mg  -     meloxicam (MOBIC) tablet 15 mg  -     pregabalin (LYRICA) capsule 75 mg  -     Case Request    Effusion of left knee  -     Case Request; Standing  -     Type and screen; Future  -     Tranexamic Acid 1,000 mg in sodium chloride 0.9 % 100 mL  -     Tranexamic Acid 1,000 mg in sodium chloride 0.9 % 100 mL  -     Bupivacaine-Meloxicam -6 MG/7ML solution 14 mL  -     ethyl alcohol 62 % 2 each  -     ceFAZolin (ANCEF) 2 g in sodium chloride 0.9 % 100 mL IVPB  -     acetaminophen (TYLENOL) tablet 975 mg  -     meloxicam (MOBIC) tablet 15 mg  -     pregabalin (LYRICA) capsule 75 mg  -     Case Request    Obstructive sleep apnea, adult  -     Case  Request; Standing  -     Type and screen; Future  -     Tranexamic Acid 1,000 mg in sodium chloride 0.9 % 100 mL  -     Tranexamic Acid 1,000 mg in sodium chloride 0.9 % 100 mL  -     Bupivacaine-Meloxicam -6 MG/7ML solution 14 mL  -     ethyl alcohol 62 % 2 each  -     ceFAZolin (ANCEF) 2 g in sodium chloride 0.9 % 100 mL IVPB  -     acetaminophen (TYLENOL) tablet 975 mg  -     meloxicam (MOBIC) tablet 15 mg  -     pregabalin (LYRICA) capsule 75 mg  -     Case Request    Chondromalacia, left knee  -     Case Request; Standing  -     Type and screen; Future  -     Tranexamic Acid 1,000 mg in sodium chloride 0.9 % 100 mL  -     Tranexamic Acid 1,000 mg in sodium chloride 0.9 % 100 mL  -     Bupivacaine-Meloxicam -6 MG/7ML solution 14 mL  -     ethyl alcohol 62 % 2 each  -     ceFAZolin (ANCEF) 2 g in sodium chloride 0.9 % 100 mL IVPB  -     acetaminophen (TYLENOL) tablet 975 mg  -     meloxicam (MOBIC) tablet 15 mg  -     pregabalin (LYRICA) capsule 75 mg  -     Case Request    Other orders  -     Follow Anesthesia Guidelines / Protocol; Future  -     Follow Anesthesia Guidelines / Protocol; Standing  -     Nerve Block; Standing  -     Verify NPO Status; Standing  -     POC Glucose Once; Standing  -     Clip operative site; Standing  -     Obtain informed consent (if not collected inpatient or PAT); Standing  -     Provide instructions to patient regarding NPO status  -     Chlorhexidine Skin Prep - Educate and Review With Patient; Future  -     Provide Patient With ERAS Hydration Instructions; Future  -     Complete A PROMIS And HOOS Or KOOS Survey If Having Hip Or Knee Replacement; Future  -     Provide NPO Instructions to Patient          PLAN    Patient has a long history of progressing knee pain unresponsive to nonoperative management.  He has had activity modification, PT, NSAIDS, intra-articular steroid injections, and viscosupplementation.  He has tried HEP with strength and conditioning as well  as working through the joint replacement booklet performing strengthening exercises.  He has pain with activity of daily living and is unhappy with his quality of life.    The patient voiced understanding of the risks, benefits, and alternative forms of treatment that were discussed and the patient consents to proceed with surgery.  All risks, benefits and alternatives were discussed.  Risks include, but not exclusive to anesthetic complications, including death, MI, CVA, infection, bleeding DVT, fracture, residual pain and need for future surgery.    This discussion was held with the patient by Jose G Quiros MD and all questions were answered.    Plan LEFT robotic total knee arthroplasty with adductor canal block.    We discussed possible same day discharge and use of press-fit implants.      Return for Post-operative eval.    Jose G Quiros MD

## 2023-09-22 PROBLEM — M23.92 INTERNAL DERANGEMENT OF LEFT KNEE: Status: ACTIVE | Noted: 2023-09-22

## 2023-09-22 PROBLEM — M17.12 PRIMARY OSTEOARTHRITIS OF LEFT KNEE: Status: ACTIVE | Noted: 2023-09-22

## 2023-09-22 PROBLEM — M94.262 CHONDROMALACIA, LEFT KNEE: Status: ACTIVE | Noted: 2023-09-22

## 2023-09-22 PROBLEM — M25.462 EFFUSION OF LEFT KNEE: Status: ACTIVE | Noted: 2023-09-22

## 2023-09-26 ENCOUNTER — TELEPHONE (OUTPATIENT)
Dept: ORTHOPEDIC SURGERY | Facility: CLINIC | Age: 58
End: 2023-09-26
Payer: COMMERCIAL

## (undated) DEVICE — CANN SMPL SOFTECH BIFLO ETCO2 A/M 7FT